# Patient Record
Sex: MALE | Race: BLACK OR AFRICAN AMERICAN | Employment: FULL TIME | URBAN - METROPOLITAN AREA
[De-identification: names, ages, dates, MRNs, and addresses within clinical notes are randomized per-mention and may not be internally consistent; named-entity substitution may affect disease eponyms.]

---

## 2017-01-06 ENCOUNTER — HOSPITAL ENCOUNTER (OUTPATIENT)
Dept: MRI IMAGING | Age: 32
Discharge: HOME | End: 2017-01-06
Attending: FAMILY MEDICINE
Payer: COMMERCIAL

## 2017-01-06 DIAGNOSIS — M25.562 ACUTE PAIN OF LEFT KNEE: ICD-10-CM

## 2017-01-06 PROCEDURE — 73721 MRI JNT OF LWR EXTRE W/O DYE: CPT

## 2017-01-09 DIAGNOSIS — M22.2X2 PATELLOFEMORAL SYNDROME OF LEFT KNEE: Primary | ICD-10-CM

## 2017-02-01 ENCOUNTER — HOSPITAL ENCOUNTER (OUTPATIENT)
Dept: PHYSICAL THERAPY | Age: 32
Discharge: HOME | End: 2017-02-01
Payer: COMMERCIAL

## 2017-02-01 PROCEDURE — 97110 THERAPEUTIC EXERCISES: CPT

## 2017-02-01 PROCEDURE — 97161 PT EVAL LOW COMPLEX 20 MIN: CPT

## 2017-02-01 NOTE — PROGRESS NOTES
[facility]  [address] [address], [address] [place] [address], [address]  Phone: [phone]  Fax: [phone]    Plan of Care/Statement of Necessity for Physical Therapy Services  2-15    Patient name: Stan Arndt  : 1985  Provider#: [id number]  Referral source: Anabel Perez MD      Medical/Treatment Diagnosis: Patellofemoral disorders, left knee [M22.2X2]     Prior Hospitalization: see medical history     Comorbidities: asthma  Prior Level of Function: recreational running and sprinting, works as a  at [place]  Medications: Verified on Patient Summary List    Licensed Physical Therapist was present throughout treatment and actively engaged in all aspects of care. Start of Care: 17      Onset Date: May 2016       The Plan of Care and following information is based on the information from the initial evaluation. Assessment/ key information:   Patient referred for L patellofemoral pain presents today with decreased flexibility and hip strength. Educated patient on L knee MRI results. Goal will be to increase L LE flexibility and strengthen VMO to correct patellar mechanics.         Evaluation Complexity History MEDIUM  Complexity : 1-2 comorbidities / personal factors will impact the outcome/ POC ; Examination HIGH Complexity : 4+ Standardized tests and measures addressing body structure, function, activity limitation and / or participation in recreation  ;Presentation LOW Complexity : Stable, uncomplicated  ;Clinical Decision Making MEDIUM Complexity : FOTO score of 26-74  Overall Complexity Rating: LOW     Problem List: pain affecting function, decrease ROM, decrease strength, decrease activity tolerance and decrease flexibility/ joint mobility   Treatment Plan may include any combination of the following: Therapeutic exercise, Therapeutic activities, Neuromuscular re-education, Physical agent/modality, Gait/balance training, Manual therapy, Patient education, Self Care training, Functional mobility training and Stair training  Patient / Family readiness to learn indicated by: asking questions, trying to perform skills and interest  Persons(s) to be included in education: patient (P)  Barriers to Learning/Limitations: None  Patient Goal (s): To be able to reduce pain so I can try to do sprints again  Patient Self Reported Health Status: excellent  Rehabilitation Potential: good    Short Term Goals: To be accomplished in 2 weeks:   1. Patient will be able to perform HEP without verbal cuing   2. Patient will be able to perform 10 bilateral squats without knee pain  Long Term Goals: To be accomplished in 4 weeks:   1. Pt will be able to complete 10 single limb squats without pain to order to jog without pain   2. Patient will be able to sprint 100 yards without knee pain. 3. Patient will be able to jog for at least 30 minutes without pain  Frequency / Duration: Patient to be seen 2 times per week for 4 weeks. Patient/ Caregiver education and instruction: self care, activity modification and exercises    [x]  Plan of care has been reviewed with BEBE Tobar 2/1/2017 5:08 PM    ________________________________________________________________________    I certify that the above Therapy Services are being furnished while the patient is under my care. I agree with the treatment plan and certify that this therapy is necessary.     [de-identified] Signature:____________________  Date:____________Time: _________

## 2017-02-01 NOTE — PROGRESS NOTES
PT INITIAL EVALUATION NOTE 2-15    Patient Name: Doron De La Cruz  Date:2017  : 1985  [x]  Patient  Verified  Payor: Juanita De La Cruz / Plan: Rufina Ramsey PPO / Product Type: PPO /    In time:5:20  Out time:6:20  Total Treatment Time (min): 60  Visit #: 1    Licensed Physical Therapist was present throughout treatment and actively engaged in all aspects of care. Treatment Area: Patellofemoral disorders, left knee [M22.2X2]    SUBJECTIVE  Pain Level (0-10 scale): 0-1/10  Any medication changes, allergies to medications, adverse drug reactions, diagnosis change, or new procedure performed?: [] No    [x] Yes (see summary sheet for update)  Subjective:     L Patellofemoral pain. L knee feels better than it used to. He'll feels it when he is jumping and planting hard \"wobble. \"  Did the elliptical around [address] [employer] week. Doesn't use brace as much anymore. Last week went running on the track and feels it in the first 5-7 minutes in the front of the knee. 2-3 times/week takes medication. Feels a little crepitus. No swelling noted. Hip flexor stretch and quad stretch might be painful. Wants to be able to fully run (sprint), worried about the creaky sound in his knee. Sitting down and rest helps it. Pain in knee initially started back in May of 2016. Works as a           OBJECTIVE/EXAMINATION    Other Observations: bilateral patella lance and L patella is laterally tilted. Stands with feet externally rotated, knock kneed  Gait and Functional Mobility:  Walks with feet externally rotated, knee valgus  Palpation: TTP on anterior patella tendon    R Knee PROM Flex: 118 deg  Ext: 0 deg  L Knee PROM Flex: 120 deg  Ext: 0 deg    Flexibility: bilateral hamstring and gastroc tightness    Joint mobility: hypermobility in bilateral patellofemoral joints    LOWER QUARTER   MUSCLE STRENGTH  KEY       R  L  0 - No Contraction  Knee ext  5/5  5/5 p!  On front of knee  1 - Trace flex  5/5  5/5  2 - Poor   Hip ext   NT  NT  3 - Fair          flex   4/5  4/5  4 - Good         abd  NT  NT  5 - Normal         add  NT  NT      Ankle DF  5/5  5/5                PF  5/5  5/5  MMT: VMO 4+/5 bilaterally               Special Tests:                  H.S. 90/90: + for tightness bilaterally          Joint line tenderness: (-) bilaterally          Knee Varus/Valgus Stress: (-)  Knee Lachmans: (-)    SLS: able to stand > 10 sec bilaterally    DL squat:hip ER bilaterally, pain felt at end range and on the way up         10 min Therapeutic Exercise:  [x] See flow sheet : to demonstrate HEP   Rationale: increase ROM and increase strength to improve the patients ability to decrease patellofemoral pain          With   [x] TE   [] TA   [] neuro   [] other: Patient Education: [x] Review HEP    [] Progressed/Changed HEP based on:   [] positioning   [] body mechanics   [] transfers   [] heat/ice application    [] other:        Other Objective/Functional Measures: 55/100    Pain Level (0-10 scale) post treatment: 0-1/10      ASSESSMENT:      [x]  See Plan of Care      Kavya Tobar 2/1/2017  5:08 PM

## 2017-02-06 ENCOUNTER — HOSPITAL ENCOUNTER (OUTPATIENT)
Dept: PHYSICAL THERAPY | Age: 32
Discharge: HOME | End: 2017-02-06
Payer: COMMERCIAL

## 2017-02-06 PROCEDURE — 97110 THERAPEUTIC EXERCISES: CPT

## 2017-02-06 NOTE — PROGRESS NOTES
PT DAILY TREATMENT NOTE 2-15    Patient Name: Jesusita Brand  Date:2017  : 1985  [x]  Patient  Verified  Payor: Pb Arevalo / Plan: Carry Abeba PPO / Product Type: PPO /    In time:4:35  Out time:5:20  Total Treatment Time (min): 45  Visit #: 2     Treatment Area: Patellofemoral disorders, left knee [B19.4S2]    Licensed Physical Therapist was present throughout treatment and actively engaged in all aspects of care. SUBJECTIVE  Pain Level (0-10 scale): 0/10  Any medication changes, allergies to medications, adverse drug reactions, diagnosis change, or new procedure performed?: [x] No    [] Yes (see summary sheet for update)  Subjective functional status/changes:   [] No changes reported  \"I actually sprinted on it yesterday and it felt ok. \"    OBJECTIVE    45 min Therapeutic Exercise:  [x] See flow sheet :   Rationale: increase ROM, increase strength and improve balance to improve the patients ability to decrease knee pain    With   [x] TE   [] TA   [] neuro   [] other: Patient Education: [x] Review HEP    [] Progressed/Changed HEP based on:   [] positioning   [] body mechanics   [] transfers   [] heat/ice application    [] other:      Other Objective/Functional Measures: NT     Pain Level (0-10 scale) post treatment: 0/10    ASSESSMENT/Changes in Function:   Progress plan of care to include agility and plyometrics training next visit. Patient will continue to benefit from skilled PT services to modify and progress therapeutic interventions, address functional mobility deficits, address ROM deficits, address strength deficits, analyze and address soft tissue restrictions, analyze and cue movement patterns, analyze and modify body mechanics/ergonomics, assess and modify postural abnormalities and address imbalance/dizziness to attain remaining goals. []  See Plan of Care  []  See progress note/recertification  []  See Discharge Summary         Progress towards goals / Updated goals:  1.  Patient will be able to perform HEP without verbal cuing  2. Patient will be able to perform 10 bilateral squats without knee pain  Long Term Goals: To be accomplished in 4 weeks:  1. Pt will be able to complete 10 single limb squats without pain to order to jog without pain  2. Patient will be able to sprint 100 yards without knee pain. 3. Patient will be able to jog for at least 30 minutes without pain    PLAN  [x]  Upgrade activities as tolerated     [x]  Continue plan of care  [x]  Update interventions per flow sheet       []  Discharge due to:_  []  Other:_      Gretchen Zamorano.  [place] 2/6/2017  4:42 PM

## 2017-02-08 ENCOUNTER — APPOINTMENT (OUTPATIENT)
Dept: PHYSICAL THERAPY | Age: 32
End: 2017-02-08
Payer: COMMERCIAL

## 2017-02-16 ENCOUNTER — APPOINTMENT (OUTPATIENT)
Dept: PHYSICAL THERAPY | Age: 32
End: 2017-02-16
Payer: COMMERCIAL

## 2017-02-16 ENCOUNTER — TELEPHONE (OUTPATIENT)
Dept: INTERNAL MEDICINE CLINIC | Age: 32
End: 2017-02-16

## 2017-02-16 NOTE — TELEPHONE ENCOUNTER
----- Message from Della Jackman sent at 2/16/2017 12:48 PM EST -----  Regarding: /Corine   Pt would like a call back from the nurse. The pt has been wheezing for 3 days and has a crackle in his lungs. Pt stated that it gets worse at night. Pt would like a call back for the nurse as soon as possible today at  [phone].

## 2017-02-17 ENCOUNTER — OFFICE VISIT (OUTPATIENT)
Dept: INTERNAL MEDICINE CLINIC | Age: 32
End: 2017-02-17

## 2017-02-17 VITALS
BODY MASS INDEX: 31.85 KG/M2 | DIASTOLIC BLOOD PRESSURE: 83 MMHG | HEART RATE: 86 BPM | SYSTOLIC BLOOD PRESSURE: 147 MMHG | TEMPERATURE: 98.3 F | WEIGHT: 222 LBS | RESPIRATION RATE: 16 BRPM

## 2017-02-17 DIAGNOSIS — J45.21 MILD INTERMITTENT ASTHMA WITH ACUTE EXACERBATION: Primary | ICD-10-CM

## 2017-02-17 DIAGNOSIS — R06.2 WHEEZING: ICD-10-CM

## 2017-02-17 RX ORDER — AZITHROMYCIN 250 MG/1
TABLET, FILM COATED ORAL
Qty: 6 TAB | Refills: 0 | Status: SHIPPED | OUTPATIENT
Start: 2017-02-17 | End: 2017-02-22

## 2017-02-17 RX ORDER — PREDNISONE 20 MG/1
20 TABLET ORAL 3 TIMES DAILY
Qty: 21 TAB | Refills: 0 | Status: SHIPPED | OUTPATIENT
Start: 2017-02-17 | End: 2017-12-01

## 2017-02-17 RX ORDER — MELOXICAM 15 MG/1
15 TABLET ORAL
Qty: 30 TAB | Refills: 0 | Status: SHIPPED | OUTPATIENT
Start: 2017-02-17 | End: 2017-12-01

## 2017-02-17 NOTE — PATIENT INSTRUCTIONS

## 2017-02-17 NOTE — MR AVS SNAPSHOT
Visit Information Date & Time Provider Department Dept. Phone Encounter #  
 2/17/2017 10:45 AM Hanh Mendoza MD [facility] [place] [id number] Follow-up Instructions Return if symptoms worsen or fail to improve. Follow-up and Disposition History Upcoming Health Maintenance Date Due DTaP/Tdap/Td series (1 - Tdap) 5/29/2008 INFLUENZA AGE 9 TO ADULT 8/1/2016 Allergies as of 2/17/2017  Review Complete On: 2/17/2017 By: Hanh Mendoza MD  
 No Known Allergies Current Immunizations  Never Reviewed No immunizations on file. Not reviewed this visit You Were Diagnosed With   
  
 Codes Comments Mild intermittent asthma with acute exacerbation    -  Primary ICD-10-CM: J45.21 ICD-9-CM: 784.65 Wheezing     ICD-10-CM: R06.2 ICD-9-CM: 786.07 Vitals BP Pulse Temp Resp Weight(growth percentile) BMI  
 147/83 86 98.3 °F (36.8 °C) 16 222 lb (100.7 kg) 31.85 kg/m2 Smoking Status Never Smoker BMI and BSA Data Body Mass Index Body Surface Area  
 31.85 kg/m 2 2.23 m 2 Preferred Pharmacy Pharmacy Name Phone [facility] [employer] [facility]  [place] [phone] Your Updated Medication List  
  
   
This list is accurate as of: 2/17/17 11:53 AM.  Always use your most recent med list.  
  
  
  
  
 ADVAIR DISKUS 100-50 mcg/dose diskus inhaler Generic drug:  fluticasone-salmeterol Take 1 Puff by inhalation every twelve (12) hours. * albuterol 2.5 mg /3 mL (0.083 %) nebulizer solution Commonly known as:  PROVENTIL VENTOLIN  
by Nebulization route once. * VENTOLIN HFA 90 mcg/actuation inhaler Generic drug:  albuterol Take  by inhalation. azithromycin 250 mg tablet Commonly known as:  [place] Ping Take 2 tablets today, then take 1 tablet daily BACTRIM PO Take  by mouth. KEFLEX PO Take  by mouth.  
  
 meloxicam 15 mg tablet Commonly known as:  MOBIC Take 1 Tab by mouth daily as needed for Pain. predniSONE 20 mg tablet Commonly known as:  Marcelene Im Take 1 Tab by mouth three (3) times daily. * Notice: This list has 2 medication(s) that are the same as other medications prescribed for you. Read the directions carefully, and ask your doctor or other care provider to review them with you. Prescriptions Sent to Pharmacy Refills  
 predniSONE (DELTASONE) 20 mg tablet 0 Sig: Take 1 Tab by mouth three (3) times daily. Class: Normal  
 Pharmacy: [facility] [address] Ph #: [phone] Route: Oral  
 azithromycin (ZITHROMAX) 250 mg tablet 0 Sig: Take 2 tablets today, then take 1 tablet daily Class: Normal  
 Pharmacy: [facility] [address] AT [address] [place] [address] Road Ph #: [phone]  
 meloxicam (MOBIC) 15 mg tablet 0 Sig: Take 1 Tab by mouth daily as needed for Pain. Class: Normal  
 Pharmacy: [facility] [address] Ph #: [phone] Route: Oral  
  
We Performed the Following REFERRAL TO ALLERGY [REF5 Custom] Comments:  
 Please evaluate patient for asthma. Follow-up Instructions Return if symptoms worsen or fail to improve. To-Do List   
 02/17/2017 Imaging:  XR CHEST PA LAT   
  
 02/20/2017 1:30 PM  
  Appointment with Burt Spurling, PT, DPT at [facility] [address] ([phone])  
  
 02/28/2017 5:30 PM  
  Appointment with Burt Spurling, PT, DPT at [facility] [address] ([phone]) Referral Information Referral ID Referred By Referred To  
  
 [id number] [facility] [address] [place] [address] [facility] [address]   
   [place] [address] [facility], [address] [facility] Visits Status Start Date End Date 1 New Request 2/17/17 2/17/18 If your referral has a status of pending review or denied, additional information will be sent to support the outcome of this decision. Patient Instructions Asthma Attack: Care Instructions Your Care Instructions During an asthma attack, the airways swell and narrow. This makes it hard to breathe. Severe asthma attacks can be life-threatening, but you can help prevent them by keeping your asthma under control and treating symptoms before they get bad. Symptoms include being short of breath, having chest tightness, coughing, and wheezing. Noting and treating these symptoms can also help you avoid future trips to the emergency room. The doctor has checked you carefully, but problems can develop later. If you notice any problems or new symptoms, get medical treatment right away. Follow-up care is a key part of your treatment and safety. Be sure to make and go to all appointments, and call your doctor if you are having problems. It's also a good idea to know your test results and keep a list of the medicines you take. How can you care for yourself at home? · Follow your asthma action plan to prevent and treat attacks. If you don't have an asthma action plan, work with your doctor to create one. · Take your asthma medicines exactly as prescribed. Talk to your doctor right away if you have any questions about how to take them. ¨ Use your quick-relief medicine when you have symptoms of an attack. Quick-relief medicine is usually an albuterol inhaler. Some people need to use quick-relief medicine before they exercise. ¨ Take your controller medicine every day, not just when you have symptoms. Controller medicine is usually an inhaled corticosteroid. The goal is to prevent problems before they occur. Don't use your controller medicine to treat an attack that has already started.  It doesn't work fast enough to help. ¨ If your doctor prescribed corticosteroid pills to use during an attack, take them exactly as prescribed. It may take hours for the pills to work, but they may make the episode shorter and help you breathe better. ¨ Keep your quick-relief medicine with you at all times. · Talk to your doctor before using other medicines. Some medicines, such as aspirin, can cause asthma attacks in some people. · If you have a peak flow meter, use it to check how well you are breathing. This can help you predict when an asthma attack is going to occur. Then you can take medicine to prevent the asthma attack or make it less severe. · Do not smoke or allow others to smoke around you. Avoid smoky places. Smoking makes asthma worse. If you need help quitting, talk to your doctor about stop-smoking programs and medicines. These can increase your chances of quitting for good. · Learn what triggers an asthma attack for you, and avoid the triggers when you can. Common triggers include colds, smoke, air pollution, dust, pollen, mold, pets, cockroaches, stress, and cold air. · Avoid colds and the flu. Get a pneumococcal vaccine shot. If you have had one before, ask your doctor if you need a second dose. Get a flu vaccine every fall. If you must be around people with colds or the flu, wash your hands often. When should you call for help? Call 911 anytime you think you may need emergency care. For example, call if: 
· You have severe trouble breathing. Call your doctor now or seek immediate medical care if: 
· Your symptoms do not get better after you have followed your asthma action plan. · You have new or worse trouble breathing. · Your coughing and wheezing get worse. · You cough up dark brown or bloody mucus (sputum). · You have a new or higher fever.  
Watch closely for changes in your health, and be sure to contact your doctor if: 
· You need to use quick-relief medicine on more than 2 days a week (unless it is just for exercise). · You cough more deeply or more often, especially if you notice more mucus or a change in the color of your mucus. · You are not getting better as expected. Where can you learn more? Go to http://remy-roz.info/. Enter A930 in the search box to learn more about \"Asthma Attack: Care Instructions. \" Current as of: May 23, 2016 Content Version: 11.1 © 2839-9993 [employer]. Care instructions adapted under license by "[employer] (which disclaims liability or warranty for this information). If you have questions about a medical condition or this instruction, always ask your healthcare professional. [address] any warranty or liability for your use of this information. Introducing [facility]! [facility] introduces [employer] patient portal. Now you can access parts of your medical record, email your doctor's office, and request medication refills online. 1. In your internet browser, go to [email or web address] [email or web address] 2. Click on the First Time User? Click Here link in the Sign In box. You will see the New Member Sign Up page. 3. Enter your [employer] Access Code exactly as it appears below. You will not need to use this code after youve completed the sign-up process. If you do not sign up before the expiration date, you must request a new code. · [employer] Access Code: [email or web address] Expires: 3/30/2017 10:21 AM 
 
4. Enter the last four digits of your Social Security Number (xxxx) and Date of Birth (mm/dd/yyyy) as indicated and click Submit. You will be taken to the next sign-up page. 5. Create a [employer]Matrix ID. This will be your [employer] login ID and cannot be changed, so think of one that is secure and easy to remember. 6. Create a [employer] password. You can change your password at any time. 7. Enter your Password Reset Question and Answer.  This can be used at a later time if you forget your password. 8. Enter your e-mail address. You will receive e-mail notification when new information is available in [address] 9. Click Sign Up. You can now view and download portions of your medical record. 10. Click the Download Summary menu link to download a portable copy of your medical information. If you have questions, please visit the Frequently Asked Questions section of the medineering website. Remember, [employer]ineering is NOT to be used for urgent needs. For medical emergencies, dial 911. Now available from your iPhone and Android! Please provide this summary of care documentation to your next provider. Your primary care clinician is listed as Germania Ramos. If you have any questions after today's visit, please call [phone].

## 2017-02-17 NOTE — PROGRESS NOTES
Chief Complaint   Patient presents with    Wheezing     he is a 36y.o. year old male who presents for evaluation of wheezing  congestion, sore throat and productive cough for 4 days. {Fever:none   no nausea . No congestion, sore throat and productive cough. Over-the-counter remedies including mucinex, Nyquil   has never been tried. Hx Asthma:  yes  Smoker:  no  Contacts with similar infections: yes   Recent travel:no         Reviewed and agree with Nurse Note and duplicated in this note. Reviewed PmHx, RxHx, FmHx, SocHx, AllgHx and updated and dated in the chart. No family history on file. Past Medical History   Diagnosis Date    Asthma     Ill-defined condition      L knee      Social History     Social History    Marital status:      Spouse name: N/A    Number of children: 2    Years of education: N/A     Occupational History   [address] [employer] [employer]     Social History Main Topics    Smoking status: Never Smoker    Smokeless tobacco: Not on file    Alcohol use Yes      Comment: socially    Drug use: No    Sexual activity: Not on file     Other Topics Concern    Not on file     Social History Narrative    Chris quinn, Attends Speaking [employer]        Review of Systems - negative except as listed above      Objective: There were no vitals filed for this visit.     Physical Examination: General appearance - alert, well appearing, and in no distress  Eyes - pupils equal and reactive, extraocular eye movements intact  Ears - bilateral TM's and external ear canals normal  Nose - normal and patent, no erythema, discharge or polyps  Mouth - mucous membranes moist, pharynx normal without lesions  Neck - supple, no significant adenopathy  Chest - wheezing noted all lung fields  Heart - normal rate, regular rhythm, normal S1, S2, no murmurs, rubs, clicks or gallops  Abdomen - soft, nontender, nondistended, no masses or organomegaly  Extremities - peripheral pulses normal, no pedal edema, no clubbing or cyanosis  Skin - normal coloration and turgor, no rashes, no suspicious skin lesions noted    Assessment/ Plan:   Nic Schmitz was seen today for wheezing and knee pain. Diagnoses and all orders for this visit:    Mild intermittent asthma with acute exacerbation    Wheezing  -     XR CHEST PA LAT; Future    Other orders  -     predniSONE (DELTASONE) 20 mg tablet; Take 1 Tab by mouth three (3) times daily. -     azithromycin (ZITHROMAX) 250 mg tablet; Take 2 tablets today, then take 1 tablet daily     Follow-up Disposition: Not on File  Adults: For nasal congestion, cough and cold/flu symptoms I advised:    - Seek medical care if symptoms become more severe or if you develop      chest pain, shortness of breath, confusion.    - Contact us if your symptoms fail to improve after 7-10 days   - Rest as much as possible and stay home from work/school at least 24 hours                  after last fever              - Wash hand frequently and cough/sneeze into your sleeve to help prevent       infection of others   - Drink plenty of fluids   - Ibuprofen (Advil, Motrin) 400-800mg every 6 hours or                  Aleve 220 mg 1-2 pills every 8 hours for fever, headache, pain   - Tylenol extra strength 500 mg every 6 hours for pain, headache, fever   - Nasal saline rinses 2-3 times daily for nasal congestion   - Mucinex 1200 mg twice daily or Guaifenesin 400 mg every 4 hours for chest       congestion              - Robitussin DM or Delsym for cough(suppress cough and thin mucus.  )   - Cepacol throat lozenges and saline gargles (1 tsp salt in 8 oz water) for sore       throat   - Tea with honey for cough (buckwheat honey preferred)              - Benadryl (diphenhydramine) 50 mg at night for nasal congestion/allergies   - Pseudoephedrine 12-hour tablets twice daily for nasal and inner ear       -Ask your pharmacist (this is kept behind the counter)     -If you have high blood pressure or heart disease, use this        medication with caution (ask your doctor), alternative coricidin   - Afrin (oxymetazoline) nasal spray 2 sprays in each nostril twice daily for severe      congestion.       -Do not use this medication for more than 3 days as it may cause         \"rebound congestion\". -If you have high blood pressure or heart disease, use this medication       with caution (ask your doctor)      Children:   Sit in bathroom with hot shower running for steam.    Nasal saline rinses and Neti pot  In general for viral respiratory infection -  Aim for drainage/increased airflow  Increase fluids - Pedialyte popsicles, Gatorade mixed with 50% water       I have discussed the diagnosis with the patient and the intended plan as seen in the above orders. The patient has received an after-visit summary and questions were answered concerning future plans. Medication Side Effects and Warnings were discussed with patient: yes  Patient Labs were reviewed and or requested: yes  Patient Past Records were reviewed and or requested  yes  I have discussed the diagnosis with the patient and the intended plan as seen in the above orders. The patient has received an after-visit summary and questions were answered concerning future plans. Pt agrees to call or return to clinic and/or go to closest ER with any worsening of symptoms. This may include, but not limited to increased fever (>100.4) with NSAIDS or Tylenol, increased edema, confusion, rash, worsening of presenting symptoms. Patient was informed/counseled to:    1) Remember to stay active and/or exercise regularly (I suggest 30-45 minutes daily)   2) For reliable dietary information, go to www. EATRIGHT.org. You may wish to consider seeing the nutritionist at [facility] at #[phone] or [phone], also consider the [address] [facility].   3) I routinely suggest a complete physical exam once each year (your birth month)

## 2017-02-20 ENCOUNTER — HOSPITAL ENCOUNTER (OUTPATIENT)
Dept: PHYSICAL THERAPY | Age: 32
Discharge: HOME | End: 2017-02-20
Payer: COMMERCIAL

## 2017-02-20 PROCEDURE — 97110 THERAPEUTIC EXERCISES: CPT

## 2017-02-20 NOTE — PROGRESS NOTES
PT DAILY TREATMENT NOTE 2-15    Patient Name: Darryle Hides  Date:2017  : 1985  [x]  Patient  Verified  Payor: Cher Cordova / Plan: Bobby Angel PPO / Product Type: PPO /    In time:1:40  Out time:2:40  Total Treatment Time (min): 60  Visit #: 3     Licensed Physical Therapist was present throughout treatment and actively engaged in all aspects of care. Treatment Area: Patellofemoral disorders, left knee [M22.2X2]    SUBJECTIVE  Pain Level (0-10 scale): 3/10  Any medication changes, allergies to medications, adverse drug reactions, diagnosis change, or new procedure performed?: [x] No    [] Yes (see summary sheet for update)  Subjective functional status/changes:   [] No changes reported  \"Sorry I missed my last appointments because I had a respiratory issue. My knee stiffened up so today it hurts a little more. \"    OBJECTIVE    60 min Therapeutic Exercise:  [x] See flow sheet :   Rationale: increase ROM, increase strength and improve balance to improve the patients ability to decrease L knee pain          With   [x] TE   [] TA   [] neuro   [] other: Patient Education: [x] Review HEP    [] Progressed/Changed HEP based on:   [] positioning   [] body mechanics   [] transfers   [] heat/ice application    [] other:      Other Objective/Functional Measures: NT     Pain Level (0-10 scale) post treatment: 0/10    ASSESSMENT/Changes in Function:   Did less today due to recovery from respiratory issues. Progress POC to include ladder and plyometric exercises next visit per patient tolerance. Patient will continue to benefit from skilled PT services to modify and progress therapeutic interventions, address functional mobility deficits, address ROM deficits, address strength deficits, analyze and address soft tissue restrictions, analyze and cue movement patterns and analyze and modify body mechanics/ergonomics to attain remaining goals.      []  See Plan of Care  []  See progress note/recertification  [] See Discharge Summary         Progress towards goals / Updated goals:  1. Patient will be able to perform HEP without verbal cuing  2. Patient will be able to perform 10 bilateral squats without knee pain  Long Term Goals: To be accomplished in 4 weeks:  1. Pt will be able to complete 10 single limb squats without pain to order to jog without pain  2. Patient will be able to sprint 100 yards without knee pain. 3. Patient will be able to jog for at least 30 minutes without pain    PLAN  [x]  Upgrade activities as tolerated     [x]  Continue plan of care  [x]  Update interventions per flow sheet       []  Discharge due to:_  []  Other:_      Karla Cai.  [place] 2/20/2017  2:11 PM

## 2017-05-22 ENCOUNTER — OFFICE VISIT (OUTPATIENT)
Dept: INTERNAL MEDICINE CLINIC | Age: 32
End: 2017-05-22

## 2017-05-22 VITALS
SYSTOLIC BLOOD PRESSURE: 111 MMHG | WEIGHT: 218 LBS | DIASTOLIC BLOOD PRESSURE: 69 MMHG | HEART RATE: 60 BPM | RESPIRATION RATE: 14 BRPM | BODY MASS INDEX: 31.21 KG/M2 | HEIGHT: 70 IN

## 2017-05-22 DIAGNOSIS — M22.2X2 PATELLOFEMORAL PAIN SYNDROME OF LEFT KNEE: ICD-10-CM

## 2017-05-22 DIAGNOSIS — M25.531 RIGHT WRIST PAIN: Primary | ICD-10-CM

## 2017-05-22 DIAGNOSIS — M22.2X1 PATELLOFEMORAL SYNDROME, RIGHT: ICD-10-CM

## 2017-05-22 DIAGNOSIS — M79.641 RIGHT HAND PAIN: ICD-10-CM

## 2017-05-22 DIAGNOSIS — M25.561 ACUTE PAIN OF RIGHT KNEE: ICD-10-CM

## 2017-05-22 NOTE — MR AVS SNAPSHOT
Visit Information Date & Time Provider Department Dept. Phone Encounter #  
 5/22/2017  4:30 PM Mackenzie Arana MD [facility] [phone] [id number] Follow-up Instructions Return if symptoms worsen or fail to improve. Follow-up and Disposition History Upcoming Health Maintenance Date Due Pneumococcal 19-64 Medium Risk (1 of 1 - PPSV23) 5/29/2006 DTaP/Tdap/Td series (1 - Tdap) 5/29/2008 Influenza Age 5 to Adult 8/1/2017 Allergies as of 5/22/2017  Review Complete On: 2/17/2017 By: Mackenzie Arana MD  
 No Known Allergies Current Immunizations  Never Reviewed No immunizations on file. Not reviewed this visit You Were Diagnosed With   
  
 Codes Comments Right wrist pain    -  Primary ICD-10-CM: M25.531 ICD-9-CM: 719.43 Right hand pain     ICD-10-CM: M79.641 ICD-9-CM: 729.5 Acute pain of right knee     ICD-10-CM: M25.561 ICD-9-CM: 719.46 Patellofemoral pain syndrome of left knee     ICD-10-CM: M22.2X2 ICD-9-CM: 719.46 Patellofemoral syndrome, right     ICD-10-CM: M22.2X1 ICD-9-CM: 719.46 Vitals BP Pulse Resp Height(growth percentile) Weight(growth percentile) BMI  
 111/69 60 14 5' 10\" (1.778 m) 218 lb (98.9 kg) 31.28 kg/m2 Smoking Status Never Smoker BMI and BSA Data Body Mass Index Body Surface Area  
 31.28 kg/m 2 2.21 m 2 Preferred Pharmacy Pharmacy Name Phone [facility] [address] [place] [address] [place] [phone] Your Updated Medication List  
  
   
This list is accurate as of: 5/22/17 11:59 PM.  Always use your most recent med list.  
  
  
  
  
 ADVAIR DISKUS 100-50 mcg/dose diskus inhaler Generic drug:  fluticasone-salmeterol Take 1 Puff by inhalation every twelve (12) hours. VENTOLIN HFA 90 mcg/actuation inhaler Generic drug:  albuterol Take  by inhalation. We Performed the Following REFERRAL TO PHYSICAL THERAPY [OQH21 Custom] Follow-up Instructions Return if symptoms worsen or fail to improve. To-Do List   
 05/22/2017 Imaging:  XR HAND RT MIN 3 V   
  
 05/22/2017 Imaging:  XR WRIST RT AP/LAT/OBL MIN 3V   
  
 06/29/2017 Imaging:  XR HAND RT MIN 3 V   
  
 06/29/2017 Imaging:  XR WRIST RT AP/LAT/OBL MIN 3V Referral Information Referral ID Referred By Referred To  
  
 [id number] OP PT DANNIELLE   
   [address] [facility], [address] Phone: [phone] Fax: [phone] Visits Status Start Date End Date  
 20 Closed 5/22/17 5/22/18 If your referral has a status of pending review or denied, additional information will be sent to support the outcome of this decision. Introducing [facility] SERVICES! Carlos Marley introduces Sionex patient portal. Now you can access parts of your medical record, email your doctor's office, and request medication refills online. 1. In your internet browser, go to [email or web address] 2. Click on the First Time User? Click Here link in the Sign In box. You will see the New Member Sign Up page. 3. Enter your Sionex Access Code exactly as it appears below. You will not need to use this code after youve completed the sign-up process. If you do not sign up before the expiration date, you must request a new code. · Sionex Access Code: [email or web address] Expires: 2/24/2018 12:22 PM 
 
4. Enter the last four digits of your Social Security Number (xxxx) and Date of Birth (mm/dd/yyyy) as indicated and click Submit. You will be taken to the next sign-up page. 5. Create a Eurofficet ID. This will be your Sionex login ID and cannot be changed, so think of one that is secure and easy to remember. 6. Create a Eurofficet password. You can change your password at any time. 7. Enter your Password Reset Question and Answer. This can be used at a later time if you forget your password. 8. Enter your e-mail address. You will receive e-mail notification when new information is available in [address] 9. Click Sign Up. You can now view and download portions of your medical record. 10. Click the Download Summary menu link to download a portable copy of your medical information. If you have questions, please visit the Frequently Asked Questions section of the Synaps[employer] website. Remember, Synapsify is NOT to be used for urgent needs. For medical emergencies, dial 911. Now available from your iPhone and Android! Please provide this summary of care documentation to your next provider. Your primary care clinician is listed as Niya Smith. If you have any questions after today's visit, please call [phone].

## 2017-05-22 NOTE — PROGRESS NOTES
Chief Complaint   Patient presents with    Wrist Pain     right    Knee Pain     right,      he is a 36y.o. year old male who presents for evaluation of right wrist and knee pain  Pain Assessment Encounter      Melly Riley  5/22/2017  Onset of Symptoms: 2 weeks  ________________________________________________________________________  Description:pain with pressure    Frequency: more than 5 times a day  Pain Scale:(1-10): 6  Trauma Hx: hit a wall  Hx of similar symptoms: No:   Radiation: no  Duration:  intermittent      Progression: is unchanged  What makes it better?: ice, OTC meds and rest  What makes it worse?:exercise  Medications tried: ibuprofen    Reviewed and agree with Nurse Note and duplicated in this note. Reviewed PmHx, RxHx, FmHx, SocHx, AllgHx and updated and dated in the chart. No family history on file.     Past Medical History:   Diagnosis Date    Asthma     Ill-defined condition     L knee      Social History     Social History    Marital status:      Spouse name: N/A    Number of children: 2    Years of education: N/A     Occupational History   [address]     Social History Main Topics    Smoking status: Never Smoker    Smokeless tobacco: Not on file    Alcohol use Yes      Comment: socially    Drug use: No    Sexual activity: Not on file     Other Topics Concern    Not on file     Social History Narrative    Scientologist kai, Attends Speaking [employer]        Review of Systems - negative except as listed above      Objective:     Vitals:    05/22/17 1651   BP: 111/69   Pulse: 60   Resp: 14   Weight: 218 lb (98.9 kg)   Height: 5' 10\" (1.778 m)       Physical Examination: General appearance - alert, well appearing, and in no distress  Back exam - full range of motion, no tenderness, palpable spasm or pain on motion  Neurological - alert, oriented, normal speech, no focal findings or movement disorder noted  Musculoskeletal - right knee exam:  The patient'sright Knee  is  normal to inspection. The ROM is normal and there is flexion to 60 Effusion is: absent The joint exhibits  absent warmth and Crepitus is: moderate. The Cliff test is:  Negative Joint Line Tenderness is  Positive medially. The [facility] Test is not done   and the Lachman is  negative   . The Anterior Drawer is  Negative. The Posterior Drawer is:  negative. Valgus Stress (for MCL) is:  normal . Varus Stress (for LCL) is  normal  . The Mo Test is negative and the Apprehension Sign:   negative    Right wrist - pain with palpation of metacarpals 2-4, no deformity;  Pain with palpation of scaphoid with palpable swelling    Extremities - peripheral pulses normal, no pedal edema, no clubbing or cyanosis  Skin - normal coloration and turgor, no rashes, no suspicious skin lesions noted    Assessment/ Plan:   Meryle Christmas was seen today for wrist pain and knee pain. Diagnoses and all orders for this visit:    Right wrist pain  -     XR WRIST RT AP/LAT/OBL MIN 3V; Future  -     XR HAND RT MIN 3 V; Future    Right hand pain  -     XR WRIST RT AP/LAT/OBL MIN 3V; Future  -     XR HAND RT MIN 3 V; Future    Acute pain of right knee    Patellofemoral pain syndrome of left knee  -     Cancel: REFERRAL TO ORTHOPEDIC SURGERY  -     REFERRAL TO PHYSICAL THERAPY    Patellofemoral syndrome, right  -     REFERRAL TO PHYSICAL THERAPY         Pathophysiology, recovery and rehabilitation process discussed and questions answered   Counseling for 30 Minutes of the total visit duration   Pictures and figures used as necessary   Provided reassurance   Handouts provided and reviewed with patient for pfps  Recommend use of carpal tunnel brace  Recommend  lower impact activities-walking, Eliptical, Nordic Track, cycling or swimming            I have discussed the diagnosis with the patient and the intended plan as seen in the above orders.   The patient has received an after-visit summary and questions were answered concerning future plans. Medication Side Effects and Warnings were discussed with patient: yes  Patient Labs were reviewed and or requested: yes  Patient Past Records were reviewed and or requested  yes  I have discussed the diagnosis with the patient and the intended plan as seen in the above orders. The patient has received an after-visit summary and questions were answered concerning future plans. Pt agrees to call or return to clinic and/or go to closest ER with any worsening of symptoms. This may include, but not limited to increased fever (>100.4) with NSAIDS or Tylenol, increased edema, confusion, rash, worsening of presenting symptoms. 1) Remember to stay active and/or exercise regularly (I suggest 30-45 minutes daily)   2) For reliable dietary information, go to www. EATRIGHT.org. You may wish to consider seeing the nutritionist at [facility] at #[phone] or [phone], also consider the [address]   3) I routinely suggest a complete physical exam once each year (your birth month)

## 2017-06-20 NOTE — ANCILLARY DISCHARGE INSTRUCTIONS
[facility] Physical Therapy  [address] [address], [address] [place] [address]  Phone: [phone]  Fax: [phone]    Discharge Summary  2-15    Patient name: Lucio Duane  : 1985  Provider#: [id number]  Referral source: Derick Veliz MD      Medical/Treatment Diagnosis: Patellofemoral disorders, left knee [M22.2X2]     Prior Hospitalization: see medical history     Comorbidities: asthma  Prior Level of Function:recreational running and sprinting, works as a  at [place]  Medications: Verified on Patient Summary List    Start of Care: 17      Onset Date:May 2016   Visits from Start of Care: 3     Missed Visits: 2  Reporting Period : 17 to 17     Short Term Goals:  1. Patient will be able to perform HEP without verbal cuing NOT MET  2. Patient will be able to perform 10 bilateral squats without knee pain  NOT MET  Long Term Goals: To be accomplished in 4 weeks:  1. Pt will be able to complete 10 single limb squats without pain to order to jog without pain  NOT MET  2. Patient will be able to sprint 100 yards without knee pain. NOT MET  3. Patient will be able to jog for at least 30 minutes without pain   NOT MET      ASSESSMENT/SUMMARY OF CARE: pt failed to return for remaining PT visits. Pt instructed in HEP. D/c from therapy at this time.     RECOMMENDATIONS:  [x]Discontinue therapy: []Patient has reached or is progressing toward set goals      []Patient is non-compliant or has abdicated      []Due to lack of appreciable progress towards set goals    Luana Goldstein, PT, DPT 2017 12:00 PM

## 2017-06-29 RX ORDER — FLUTICASONE PROPIONATE AND SALMETEROL 100; 50 UG/1; UG/1
1 POWDER RESPIRATORY (INHALATION) EVERY 12 HOURS
Qty: 1 INHALER | Refills: 2 | Status: SHIPPED | OUTPATIENT
Start: 2017-06-29 | End: 2017-12-16 | Stop reason: SDUPTHER

## 2017-06-29 RX ORDER — ALBUTEROL SULFATE 90 UG/1
2 AEROSOL, METERED RESPIRATORY (INHALATION)
Qty: 1 INHALER | Refills: 2 | Status: SHIPPED | OUTPATIENT
Start: 2017-06-29 | End: 2019-05-13 | Stop reason: SDUPTHER

## 2017-07-26 ENCOUNTER — HOSPITAL ENCOUNTER (OUTPATIENT)
Dept: PHYSICAL THERAPY | Age: 32
Discharge: HOME | End: 2017-07-26
Payer: COMMERCIAL

## 2017-07-26 PROCEDURE — 97016 VASOPNEUMATIC DEVICE THERAPY: CPT

## 2017-07-26 PROCEDURE — 97161 PT EVAL LOW COMPLEX 20 MIN: CPT

## 2017-07-26 PROCEDURE — 97110 THERAPEUTIC EXERCISES: CPT

## 2017-07-26 PROCEDURE — 97112 NEUROMUSCULAR REEDUCATION: CPT

## 2017-07-26 NOTE — PROGRESS NOTES
[address] [address] (MOB IV), [address] [facility] Crescencio Pryor  Phone: [phone] Fax: [phone]     Plan of Care/Statement of Necessity for Physical Therapy Services  2-15    Patient name: Esau Pierce  : 1985  Provider#: [id number]  Referral source: Tab Prajapati MD      Medical/Treatment Diagnosis: Left knee pain [M25.562]     Prior Hospitalization: see medical history     Comorbidities: none noted  Prior Level of Function: Pt works full-time as RN for the [employer]; completes 20 minutes of exercise 1-2x/week  Medications: Verified on Patient Summary List  Start of Care: 2017     Onset Date: 2017   The Plan of Care and following information is based on the information from the initial evaluation. Assessment/ key information: Pt is a 29year old male who is referred to Physical Therapy by Dr. Pop Conteh s/p left tibial tubercle osteotomy and arthroscopic microfracture procedure to [facility], 2017. Pt has hinge brace locked in extension and using unilateral axillary crutch on right for ambulation, WBAT. Pt demonstrates decreased right knee ROM: AROM -/5/60 degreePs; PROM -/10/68 degrees  Patient will benefit from skilled PT services to modify and progress therapeutic interventions, address functional mobility deficits, address ROM deficits, address strength deficits, analyze and address soft tissue restrictions and analyze and cue movement patterns to attain pt/PT goals.     Evaluation Complexity History LOW Complexity : Zero comorbidities / personal factors that will impact the outcome / POC; Examination HIGH Complexity : 4+ Standardized tests and measures addressing body structure, function, activity limitation and / or participation in recreation  ;Presentation LOW Complexity : Stable, uncomplicated  ;Clinical Decision Making MEDIUM Complexity : FOTO score of 26-74  Overall Complexity Rating: LOW     Problem List: pain affecting function, decrease ROM, decrease strength, edema affecting function, impaired gait/ balance, decrease ADL/ functional abilitiies, decrease activity tolerance, decrease flexibility/ joint mobility and decrease transfer abilities   Treatment Plan may include any combination of the following: Therapeutic exercise, Therapeutic activities, Neuromuscular re-education, Physical agent/modality, Gait/balance training, Manual therapy, Patient education, Self Care training, Functional mobility training and Stair training  Patient / Family readiness to learn indicated by: asking questions, trying to perform skills and interest  Persons(s) to be included in education: patient (P)  Barriers to Learning/Limitations: None  Patient Goal (s): To return to normal.  Patient Self Reported Health Status: excellent  Rehabilitation Potential: good    Short Term Goals: To be accomplished in 6 weeks:  1) Pt will be Independent with HEP. 2) Pt will demonstrate knee extension >/= to 0 degrees ext to aid in ambulation. 3) Pt will be able to smbulate with more normalized gait pattern on level terrain. 4) Pt will demonstrate knee flexion >/= to 110 to aid in sitting. Long Term Goals: To be accomplished in 12 weeks:  1) Pt will be able to navigate a flight of stairs without pain. 2) Pt will be able to ambulate on uneven terrain without pain or instability. 3) Pt will demonstrate knee flexion >/= 120 degrees to aid in sitting/squatting. 4) Pt will report improvement in overall functional mobility, as measured by FOTO, with an increased score of at least 30 points, from 26 to 56. Frequency / Duration: Patient to be seen 2-3 times per week for 12 weeks.     Patient/ Caregiver education and instruction: self care, activity modification, brace/ splint application and exercises    [x]  Plan of care has been reviewed with BEBE Alcantar, PT, DPT    7/26/2017 3:59 PM    ________________________________________________________________________    I certify that the above Therapy Services are being furnished while the patient is under my care. I agree with the treatment plan and certify that this therapy is necessary.     [de-identified] Signature:____________________  Date:____________Time: _________

## 2017-07-26 NOTE — PROGRESS NOTES
PT INITIAL EVALUATION NOTE - [facility] 2-15    Patient Name: Paola Reyna  Date:2017  : 1985  [x]  Patient  Verified  Payor: Keri Bowels / Plan: Lara Licona PPO / Product Type: PPO /    In time:12:45pm  Out time: 2:15pm  Total Treatment Time (min):  90  Total Timed Codes (min): 65  1:1 Treatment Time ( only): --   Visit #: 1     Treatment Area: Left knee pain [M25.562]    SUBJECTIVE  Pain Level (0-10 scale): 7/10  Any medication changes, allergies to medications, adverse drug reactions, diagnosis change, or new procedure performed?: [] No    [x] Yes (see summary sheet for update)  Subjective:    Pt has had patellofemoral pain for several years. Running last year around Mother's Day, ran 4 miles and the next day could not walk up the stairs due to left knee pain. Pt went to mick Hernandez MD, who said he had OA. Pt wanted a second opinion so went to Dr. Azam Treviño who referred him to PT. Pt participated in outpatient PT at [facility] from 2016-2017 and took Meloxicam- all helped but pain persisted. Pt had MRI in 2017, which showed cartilage damage. Pt tried PT again in the spring 2017, but did not feel any improvement. Dr. Azam Treviño referred pt to Dr. Anil Burleson who performed left knee Kymberly osteotomy and arthroscopic microfracture procedure to trochlea, on 2017. Pt non-weight bearing with bilateral axillary crutches until Monday, 2017 and pt has been using unilateral axillary crutch for ambulation with brace locked in extension, since. PLOF: only able to run 20-25 minutes before aching pain in left knee  Mechanism of Injury: running in Spring 2016  Previous Treatment/Compliance: PT, pain medication, rest, ice  PMHx/Surgical Hx: none noted  Work Hx: 7 [employer]  Living Situation:   Pt Goals:  \"Return to normal.\"  Barriers: pain  Motivation: high  Substance use: none  FABQ Score: moderate OBJECTIVE/EXAMINATION  Posture:  Weight shifted to the right in standing; guarded left LE  Gait and Functional Mobility:  Hinge brace locked in extension donned on left knee; pt using unilateral axillary crutch for ambulation.   Other Observations: left knee wrapped in ace bandage; once removed, observe that surgical incisions appear to be healing nicely- covered with clean and dry steri strips  Palpation: swelling in right knee, 2cm greater than left- measured at popliteal fossa    Left Knee AROM -/5/60 degrees PROM -/10/68 degrees    Joint Mobility Assessment: NT due to swelling and s/p 8 days arthroscopic knee surgery    Flexibility: tightness in left gastrocnemius and soleus muscles    LOWER QUARTER   MUSCLE STRENGTH  KEY       R  L  0 - No Contraction  Knee ext  --  3/5  1 - Trace            flex  --  2/5  2 - Poor   Hip ext   --  4/5  3 - Fair          flex   --  4/5  4 - Good         abd  --  3/5  5 - Normal         add  --  4+/5      Ankle DF  --  5/5                PF  --  5/5          Neurological: Reflexes / Sensations: normal  Special Tests: NT due to 8 days s/p/left zane osteomy    Modality rationale: decrease inflammation and decrease pain to improve the patients ability to ambulate and perform functional activities    Min Type Additional Details    [] Estim: []Att   []Unatt        []TENS instruct                  []IFC  []Premod   []NMES                     []Other:  []w/US   []w/ice   []w/heat  Position:  Location:    []  Traction: [] Cervical       []Lumbar                       [] Prone          []Supineikem                       []Intermittent   []Continuous Lbs:  [] before manual  [] after manual  []w/heat    []  Ultrasound: []Continuous   [] Pulsed at:                           []1MHz   []3MHz Location:  W/cm2:    [] Paraffin         Location:   []w/heat    []  Ice     []  Heat  []  Ice massage Position:  Location:    []  Laser  []  Other: Position:  Location:   15   [x]  Vasopneumatic Device Pressure:       [] lo [x] med [] hi   Temperature: 34  Location: left knee at end of session with LE supported on wedge    [x] Skin assessment post-treatment:  [x]intact []redness- no adverse reaction    []redness  adverse reaction:     10 min Therapeutic Exercise:  [x] See flow sheet :   Rationale: increase ROM, increase strength and improve coordination to improve the patients ability to ambulate and perform functional transfers with increased ease     10 min Neuromuscular Re-education:  [x]  See flow sheet : [place] to left quad with quadriceps set   Rationale: increase strength and quad control to improve the patients ability to perform functional activities    With   [] TE   [] TA   [] neuro   [] other: Patient Education: [x] Review HEP- pt given handout with exercises for HEP    [] Progressed/Changed HEP based on:   [] positioning   [] body mechanics   [] transfers   [] heat/ice application    [] other:      Other Objective/Functional Measures: FOTO: 26    Pain Level (0-10 scale) post treatment: 3/10    ASSESSMENT/Changes in Function:   Pt is a [address]25 [place] [address] old male who is referred to Physical Therapy by Dr. Radha Kathleen s/p left tibial tubercle osteotomy and arthroscopic microfracture procedure to [facility], 07/18/2017. Pt has hinge brace locked in extension and using unilateral axillary crutch on right for ambulation, WBAT. Pt demonstrates decreased right knee ROM: AROM -/5/60 degreePs; PROM -/10/68 degrees  Patient will benefit from skilled PT services to modify and progress therapeutic interventions, address functional mobility deficits, address ROM deficits, address strength deficits, analyze and address soft tissue restrictions and analyze and cue movement patterns to attain pt/PT goals.     [x]  See Plan of [address], PT 7/26/2017  12:50 PM

## 2017-07-27 ENCOUNTER — HOSPITAL ENCOUNTER (OUTPATIENT)
Dept: PHYSICAL THERAPY | Age: 32
Discharge: HOME | End: 2017-07-27
Payer: COMMERCIAL

## 2017-07-27 PROCEDURE — 97110 THERAPEUTIC EXERCISES: CPT

## 2017-07-27 PROCEDURE — 97112 NEUROMUSCULAR REEDUCATION: CPT

## 2017-07-27 PROCEDURE — 97016 VASOPNEUMATIC DEVICE THERAPY: CPT

## 2017-07-27 NOTE — PROGRESS NOTES
PT DAILY TREATMENT NOTE - [facility] 2-15    Patient Name: Kianna Plaza  Date:2017  : 1985  [x]  Patient  Verified  Payor: Waldo Srivastava / Plan: Paulie Irving PPO / Product Type: PPO /    In time: 8:00am  Out time:9:20am  Total Treatment Time (min): 80  Total Timed Codes (min): 65  1:1 Treatment Time ( only): --   Visit #: 2     Treatment Area: Left knee pain [M25.562]    SUBJECTIVE  Pain Level (0-10 scale): 210  Any medication changes, allergies to medications, adverse drug reactions, diagnosis change, or new procedure performed?: [x] No    [] Yes (see summary sheet for update)  Subjective functional status/changes:   [] No changes reported  Pt reports compliance with HEP. Pt enters clinic ambulating with brace donned, locked in extension, without assistive device; pt states that he forgot his crutch this morning because he was rushing.     OBJECTIVE  Modality rationale: decrease inflammation and decrease pain to improve the patients ability to ambulate and perform functional activities with increased ease   Min Type Additional Details    [] Estim: []Att   []Unatt        []TENS instruct                  []IFC  []Premod   []NMES                     []Other:  []w/US   []w/ice   []w/heat  Position:  Location:    []  Traction: [] Cervical       []Lumbar                       [] Prone          []Supine                       []Intermittent   []Continuous Lbs:  [] before manual  [] after manual  []w/heat    []  Ultrasound: []Continuous   [] Pulsed at:                           []1MHz   []3MHz Location:  W/cm2:    [] Paraffin         Location:   []w/heat    []  Ice     []  Heat  []  Ice massage Position:  Location:    []  Laser  []  Other: Position:  Location:   15   [x]  Vasopneumatic Device Pressure:       [] lo [x] med [] hi   Temperature: 34  Location: left knee, with LEs supported on wedge, at end of session   [x] Skin assessment post-treatment:  [x]intact []redness- no adverse reaction    []redness  adverse reaction:     50 min Therapeutic Exercise:  [x] See flow sheet:   Rationale: increase ROM, increase strength, improve coordination and improve balance to improve the patients ability to ambulate and perform functional activities     15 min Neuromuscular Re-education:  [x]  See flow sheet: [place] electrical stimulation to right quad with active quad set   Rationale: increase ROM, increase strength and improve coordination  to improve quad activation and control          With   [] TE   [] TA   [] neuro   [] other: Patient Education: [x] Review HEP    [] Progressed/Changed HEP based on:   [] positioning   [] body mechanics   [] transfers   [] heat/ice application    [] other:      Other Objective/Functional Measures: --     Pain Level (0-10 scale) post treatment: 1/10    ASSESSMENT/Changes in Function:   Added SLR in brace; initially pt required active assistance for flexion but demonstrated improved quad activation and independent performance after first set of 10. Patient will continue to benefit from skilled PT services to modify and progress therapeutic interventions, address functional mobility deficits, address ROM deficits, address strength deficits, analyze and address soft tissue restrictions, analyze and cue movement patterns, analyze and modify body mechanics/ergonomics, assess and modify postural abnormalities and address imbalance to attain remaining goals. []  See Plan of Care  []  See progress note/recertification  []  See Discharge Summary         Progress towards goals / Updated goals:     Short Term Goals: To be accomplished in 6 weeks:  1) Pt will be Independent with HEP. MET  2) Pt will demonstrate knee extension >/= to 0 degrees ext to aid in ambulation. 3) Pt will be able to smbulate with more normalized gait pattern on level terrain. 4) Pt will demonstrate knee flexion >/= to 110 to aid in sitting.     Long Term Goals:  To be accomplished in 12 weeks:  1) Pt will be able to navigate a flight of stairs without pain. 2) Pt will be able to ambulate on uneven terrain without pain or instability. 3) Pt will demonstrate knee flexion >/= 120 degrees to aid in sitting/squatting. 4) Pt will report improvement in overall functional mobility, as measured by FOTO, with an increased score of at least 30 points, from 26 to 56.      PLAN  [x]  Upgrade activities as tolerated     [x]  Continue plan of care  []  Update interventions per flow sheet       []  Discharge due to:_  []  Other:_      Shaina Marie PT, DPT   7/27/2017  8:15 AM

## 2017-07-31 ENCOUNTER — HOSPITAL ENCOUNTER (OUTPATIENT)
Dept: PHYSICAL THERAPY | Age: 32
Discharge: HOME | End: 2017-07-31
Payer: COMMERCIAL

## 2017-07-31 PROCEDURE — 97016 VASOPNEUMATIC DEVICE THERAPY: CPT

## 2017-07-31 PROCEDURE — 97112 NEUROMUSCULAR REEDUCATION: CPT

## 2017-07-31 PROCEDURE — 97110 THERAPEUTIC EXERCISES: CPT

## 2017-07-31 NOTE — PROGRESS NOTES
PT DAILY TREATMENT NOTE - [facility] 2-15    Patient Name: Jerod Mendiola  Date:2017  : 1985  [x]  Patient  Verified  Payor: Debria Gaucher / Plan: Nilda Ibanez PPO / Product Type: PPO /    In time: 8:05am  Out time:9:30am  Total Treatment Time (min): 85  Total Timed Codes (min): 70  1:1 Treatment Time ( only): --   Visit #: 3    Treatment Area: Left knee pain [M25.562]    SUBJECTIVE  Pain Level (0-10 scale): 3/10  Any medication changes, allergies to medications, adverse drug reactions, diagnosis change, or new procedure performed?: [x] No    [] Yes (see summary sheet for update)  Subjective functional status/changes:   [] No changes reported  Pt reports compliance with HEP, 2x/day. Pt complains of new pain in right buttock that radiates down right leg.     OBJECTIVE  Modality rationale: decrease inflammation and decrease pain to improve the patients ability to ambulate and perform functional activities with increased ease   Min Type Additional Details    [] Estim: []Att   []Unatt        []TENS instruct                  []IFC  []Premod   []NMES                     []Other:  []w/US   []w/ice   []w/heat  Position:  Location:    []  Traction: [] Cervical       []Lumbar                       [] Prone          []Supine                       []Intermittent   []Continuous Lbs:  [] before manual  [] after manual  []w/heat    []  Ultrasound: []Continuous   [] Pulsed at:                           []1MHz   []3MHz Location:  W/cm2:    [] Paraffin         Location:   []w/heat    []  Ice     []  Heat  []  Ice massage Position:  Location:    []  Laser  []  Other: Position:  Location:   15   [x]  Vasopneumatic Device Pressure:       [] lo [x] med [] hi   Temperature: 34  Location: left knee, with LEs supported on wedge, at end of session   [x] Skin assessment post-treatment:  [x]intact []redness- no adverse reaction    []redness  adverse reaction:     55 min Therapeutic Exercise:  [x] See flow sheet:   Rationale: increase ROM, increase strength, improve coordination and improve balance to improve the patients ability to ambulate and perform functional activities     15 min Neuromuscular Re-education:  [x]  See flow sheet: [place] electrical stimulation to right quad with active quad set   Rationale: increase ROM, increase strength and improve coordination  to improve quad activation and control          With   [] TE   [] TA   [] neuro   [] other: Patient Education: [x] Review HEP    [] Progressed/Changed HEP based on:   [] positioning   [] body mechanics   [] transfers   [] heat/ice application    [] other:      Other Objective/Functional Measures: --     Pain Level (0-10 scale) post treatment: 2/10    ASSESSMENT/Changes in Function:   Pt demonstrated improved quad control and strength when performing SLR exercises with brace donned. Patient will continue to benefit from skilled PT services to modify and progress therapeutic interventions, address functional mobility deficits, address ROM deficits, address strength deficits, analyze and address soft tissue restrictions, analyze and cue movement patterns, analyze and modify body mechanics/ergonomics, assess and modify postural abnormalities and address imbalance to attain remaining goals. []  See Plan of Care  []  See progress note/recertification  []  See Discharge Summary         Progress towards goals / Updated goals:     Short Term Goals: To be accomplished in 6 weeks:  1) Pt will be Independent with HEP. MET  2) Pt will demonstrate knee extension >/= to 0 degrees ext to aid in ambulation. 3) Pt will be able to smbulate with more normalized gait pattern on level terrain. 4) Pt will demonstrate knee flexion >/= to 110 to aid in sitting.     Long Term Goals: To be accomplished in 12 weeks:  1) Pt will be able to navigate a flight of stairs without pain. 2) Pt will be able to ambulate on uneven terrain without pain or instability.   3) Pt will demonstrate knee flexion >/= 120 degrees to aid in sitting/squatting. 4) Pt will report improvement in overall functional mobility, as measured by FOTO, with an increased score of at least 30 points, from 26 to 56.      PLAN  [x]  Upgrade activities as tolerated     [x]  Continue plan of care  []  Update interventions per flow sheet       []  Discharge due to:_  []  Other:_      Yesenia Toro, PT, DPT   7/31/2017  8:15 AM

## 2017-08-03 ENCOUNTER — HOSPITAL ENCOUNTER (OUTPATIENT)
Dept: PHYSICAL THERAPY | Age: 32
Discharge: HOME | End: 2017-08-03
Payer: COMMERCIAL

## 2017-08-03 PROCEDURE — 97016 VASOPNEUMATIC DEVICE THERAPY: CPT

## 2017-08-03 PROCEDURE — 97110 THERAPEUTIC EXERCISES: CPT

## 2017-08-03 PROCEDURE — 97112 NEUROMUSCULAR REEDUCATION: CPT

## 2017-08-03 NOTE — PROGRESS NOTES
PT DAILY TREATMENT NOTE - [facility] 2-15    Patient Name: Paola Reyna  Date:8/3/2017  : 1985  [x]  Patient  Verified  Payor: Keri Bowels / Plan: Lara Licona PPO / Product Type: PPO /    In time: 12:35pm  Out time: 2:00pm  Total Treatment Time (min): 85  Total Timed Codes (min): 70  1:1 Treatment Time ( only): --   Visit #: 4    Treatment Area: Left knee pain [M25.562]    SUBJECTIVE  Pain Level (0-10 scale): 4/10  Any medication changes, allergies to medications, adverse drug reactions, diagnosis change, or new procedure performed?: [x] No    [] Yes (see summary sheet for update)  Subjective functional status/changes:   [] No changes reported  Pt admits to not wearing the brace at home. Pt complains of pain and continues to take prescription pain medication.       OBJECTIVE  Modality rationale: decrease inflammation and decrease pain to improve the patients ability to ambulate and perform functional activities with increased ease   Min Type Additional Details    [] Estim: []Att   []Unatt        []TENS instruct                  []IFC  []Premod   []NMES                     []Other:  []w/US   []w/ice   []w/heat  Position:  Location:    []  Traction: [] Cervical       []Lumbar                       [] Prone          []Supine                       []Intermittent   []Continuous Lbs:  [] before manual  [] after manual  []w/heat    []  Ultrasound: []Continuous   [] Pulsed at:                           []1MHz   []3MHz Location:  W/cm2:    [] Paraffin         Location:   []w/heat    []  Ice     []  Heat  []  Ice massage Position:  Location:    []  Laser  []  Other: Position:  Location:   15   [x]  Vasopneumatic Device Pressure:       [] lo [x] med [] hi   Temperature: 34  Location: left knee, with LEs supported on wedge, at end of session   [x] Skin assessment post-treatment:  [x]intact []redness- no adverse reaction    []redness  adverse reaction:     55 min Therapeutic Exercise:  [x] See flow sheet:   Rationale: increase ROM, increase strength, improve coordination and improve balance to improve the patients ability to ambulate and perform functional activities     15 min Neuromuscular Re-education:  [x]  See flow sheet: [place] electrical stimulation to right quad with active quad set   Rationale: increase ROM, increase strength and improve coordination  to improve quad activation and control          With   [] TE   [] TA   [] neuro   [] other: Patient Education: [x] Review HEP    [] Progressed/Changed HEP based on:   [] positioning   [] body mechanics   [] transfers   [] heat/ice application    [] other:      Other Objective/Functional Measures: --     Pain Level (0-10 scale) post treatment: 1/10    ASSESSMENT/Changes in Function:   Discussed the importance of compliance with the knee brace, even at home. Added prone TKE with which pt fatigued quickly; denied pain. Pt demonstrates improved quad control and performance of SLR exercises. Patient will continue to benefit from skilled PT services to modify and progress therapeutic interventions, address functional mobility deficits, address ROM deficits, address strength deficits, analyze and address soft tissue restrictions, analyze and cue movement patterns, analyze and modify body mechanics/ergonomics, assess and modify postural abnormalities and address imbalance to attain remaining goals. []  See Plan of Care  []  See progress note/recertification  []  See Discharge Summary         Progress towards goals / Updated goals:     Short Term Goals: To be accomplished in 6 weeks:  1) Pt will be Independent with HEP. MET  2) Pt will demonstrate knee extension >/= to 0 degrees ext to aid in ambulation. 3) Pt will be able to smbulate with more normalized gait pattern on level terrain. 4) Pt will demonstrate knee flexion >/= to 110 to aid in sitting.     Long Term Goals: To be accomplished in 12 weeks:  1) Pt will be able to navigate a flight of stairs without pain.    2) Pt will be able to ambulate on uneven terrain without pain or instability. 3) Pt will demonstrate knee flexion >/= 120 degrees to aid in sitting/squatting. 4) Pt will report improvement in overall functional mobility, as measured by FOTO, with an increased score of at least 30 points, from 26 to 56.      PLAN  [x]  Upgrade activities as tolerated     [x]  Continue plan of care  []  Update interventions per flow sheet       []  Discharge due to:_  []  Other:_      Gaby Mayer PT, DPT   8/3/2017  8:15 AM

## 2017-08-07 ENCOUNTER — HOSPITAL ENCOUNTER (OUTPATIENT)
Dept: PHYSICAL THERAPY | Age: 32
Discharge: HOME | End: 2017-08-07
Payer: COMMERCIAL

## 2017-08-07 PROCEDURE — 97016 VASOPNEUMATIC DEVICE THERAPY: CPT

## 2017-08-07 PROCEDURE — 97112 NEUROMUSCULAR REEDUCATION: CPT

## 2017-08-07 PROCEDURE — 97110 THERAPEUTIC EXERCISES: CPT

## 2017-08-07 NOTE — PROGRESS NOTES
PT DAILY TREATMENT NOTE - [facility] 2-15    Patient Name: Lawrence Jewell  Date:2017  : 1985  [x]  Patient  Verified  Payor: Sherri Maloney / Plan: Linnea Spencer PPO / Product Type: PPO /    In time: 1:00pm  Out time: 2:30pm  Total Treatment Time (min): 90  Total Timed Codes (min): 75  1:1 Treatment Time ( only): --   Visit #: 5    Treatment Area: Left knee pain [M25.562]    SUBJECTIVE  Pain Level (0-10 scale): 3/10  Any medication changes, allergies to medications, adverse drug reactions, diagnosis change, or new procedure performed?: [x] No    [] Yes (see summary sheet for update)  Subjective functional status/changes:   [] No changes reported  Pt had follow-up with Dr. Diaz Castro on Friday, 2017. Left knee hinge brace was unlocked to 90 degrees. Pt was given refill of pain medication and told to stop taking meloxicam and instead take ibuprofen for pain, as needed.     OBJECTIVE  Modality rationale: decrease inflammation and decrease pain to improve the patients ability to ambulate and perform functional activities with increased ease   Min Type Additional Details    [] Estim: []Att   []Unatt        []TENS instruct                  []IFC  []Premod   []NMES                     []Other:  []w/US   []w/ice   []w/heat  Position:  Location:    []  Traction: [] Cervical       []Lumbar                       [] Prone          []Supine                       []Intermittent   []Continuous Lbs:  [] before manual  [] after manual  []w/heat    []  Ultrasound: []Continuous   [] Pulsed at:                           []1MHz   []3MHz Location:  W/cm2:    [] Paraffin         Location:   []w/heat    []  Ice     []  Heat  []  Ice massage Position:  Location:    []  Laser  []  Other: Position:  Location:   15   [x]  Vasopneumatic Device Pressure:       [] lo [x] med [] hi   Temperature: 34  Location: left knee, with LEs supported on wedge, at end of session   [x] Skin assessment post-treatment:  [x]intact []redness- no adverse reaction    []redness  adverse reaction:     55 min Therapeutic Exercise:  [x] See flow sheet:   Rationale: increase ROM, increase strength, improve coordination and improve balance to improve the patients ability to ambulate and perform functional activities     15 min Neuromuscular Re-education:  [x]  See flow sheet: [place] electrical stimulation to right quad with active quad set   Rationale: increase ROM, increase strength and improve coordination  to improve quad activation and control          With   [] TE   [] TA   [] neuro   [] other: Patient Education: [x] Review HEP    [] Progressed/Changed HEP based on:   [] positioning   [] body mechanics   [] transfers   [] heat/ice application    [] other:      Other Objective/Functional Measures: --     Pain Level (0-10 scale) post treatment: 1/10    ASSESSMENT/Changes in Function:   Pt tolerated progression of strengthening exercises, including total gym squat (flexion limited to 70 degrees) and heel raise on step, without increase in left knee pain. Patient will continue to benefit from skilled PT services to modify and progress therapeutic interventions, address functional mobility deficits, address ROM deficits, address strength deficits, analyze and address soft tissue restrictions, analyze and cue movement patterns, analyze and modify body mechanics/ergonomics, assess and modify postural abnormalities and address imbalance to attain remaining goals. []  See Plan of Care  []  See progress note/recertification  []  See Discharge Summary         Progress towards goals / Updated goals:     Short Term Goals: To be accomplished in 6 weeks:  1) Pt will be Independent with HEP. MET  2) Pt will demonstrate knee extension >/= to 0 degrees ext to aid in ambulation. 3) Pt will be able to smbulate with more normalized gait pattern on level terrain. 4) Pt will demonstrate knee flexion >/= to 110 to aid in sitting.     Long Term Goals:  To be accomplished in 12 weeks:  1) Pt will be able to navigate a flight of stairs without pain. 2) Pt will be able to ambulate on uneven terrain without pain or instability. 3) Pt will demonstrate knee flexion >/= 120 degrees to aid in sitting/squatting. 4) Pt will report improvement in overall functional mobility, as measured by FOTO, with an increased score of at least 30 points, from 26 to 56.      PLAN  [x]  Upgrade activities as tolerated     [x]  Continue plan of care  []  Update interventions per flow sheet       []  Discharge due to:_  []  Other:_      Gloria Carrasco PT, DPT   8/7/2017  8:15 AM

## 2017-08-10 ENCOUNTER — APPOINTMENT (OUTPATIENT)
Dept: PHYSICAL THERAPY | Age: 32
End: 2017-08-10
Payer: COMMERCIAL

## 2017-08-14 ENCOUNTER — HOSPITAL ENCOUNTER (OUTPATIENT)
Dept: PHYSICAL THERAPY | Age: 32
End: 2017-08-14
Payer: COMMERCIAL

## 2017-08-16 ENCOUNTER — HOSPITAL ENCOUNTER (OUTPATIENT)
Dept: PHYSICAL THERAPY | Age: 32
Discharge: HOME | End: 2017-08-16
Payer: COMMERCIAL

## 2017-08-16 PROCEDURE — 97110 THERAPEUTIC EXERCISES: CPT

## 2017-08-16 PROCEDURE — 97016 VASOPNEUMATIC DEVICE THERAPY: CPT

## 2017-08-16 PROCEDURE — 97112 NEUROMUSCULAR REEDUCATION: CPT

## 2017-08-16 NOTE — PROGRESS NOTES
PT DAILY TREATMENT NOTE - [facility] 2-15    Patient Name: Jeanie Chapman  Date:2017  : 1985  [x]  Patient  Verified  Payor: Wandy Marina / Plan: Wang Walker PPO / Product Type: PPO /    In time: 12:30pm  Out time: 1:55pm  Total Treatment Time (min): 85  Total Timed Codes (min): 70  1:1 Treatment Time ( only): --   Visit #: 6    Treatment Area: Left knee pain [M25.562]    SUBJECTIVE  Pain Level (0-10 scale): 2/10  Any medication changes, allergies to medications, adverse drug reactions, diagnosis change, or new procedure performed?: [x] No    [] Yes (see summary sheet for update)  Subjective functional status/changes:   [] No changes reported  Pt reports \"forgetting\" his brace today. Pt states that pain in left knee is much better and he is down to taking 2 pain pills/day (1 in the morning and 1 at night).     OBJECTIVE  Modality rationale: decrease inflammation and decrease pain to improve the patients ability to ambulate and perform functional activities with increased ease   Min Type Additional Details    [] Estim: []Att   []Unatt        []TENS instruct                  []IFC  []Premod   []NMES                     []Other:  []w/US   []w/ice   []w/heat  Position:  Location:    []  Traction: [] Cervical       []Lumbar                       [] Prone          []Supine                       []Intermittent   []Continuous Lbs:  [] before manual  [] after manual  []w/heat    []  Ultrasound: []Continuous   [] Pulsed at:                           []1MHz   []3MHz Location:  W/cm2:    [] Paraffin         Location:   []w/heat    []  Ice     []  Heat  []  Ice massage Position:  Location:    []  Laser  []  Other: Position:  Location:   15   [x]  Vasopneumatic Device Pressure:       [] lo [x] med [] hi   Temperature: 34  Location: left knee, with LEs supported on wedge, at end of session   [x] Skin assessment post-treatment:  [x]intact []redness- no adverse reaction    []redness  adverse reaction:     60 min Therapeutic Exercise:  [x] See flow sheet: manual prone quad stretch   Rationale: increase ROM, increase strength, improve coordination and improve balance to improve the patients ability to ambulate and perform functional activities     10 min Neuromuscular Re-education:  [x]  See flow sheet: [place] electrical stimulation to right quad with active quad set   Rationale: increase ROM, increase strength and improve coordination  to improve quad activation and control          With   [] TE   [] TA   [] neuro   [] other: Patient Education: [x] Review HEP    [] Progressed/Changed HEP based on:   [] positioning   [] body mechanics   [] transfers   [] heat/ice application    [] other:      Other Objective/Functional Measures: --     Pain Level (0-10 scale) post treatment: 1/10    ASSESSMENT/Changes in Function:   Added elliptical today, which pt was able to perform without left knee pain but required awareness and effort for quad control. Patient will continue to benefit from skilled PT services to modify and progress therapeutic interventions, address functional mobility deficits, address ROM deficits, address strength deficits, analyze and address soft tissue restrictions, analyze and cue movement patterns, analyze and modify body mechanics/ergonomics, assess and modify postural abnormalities and address imbalance to attain remaining goals. []  See Plan of Care  []  See progress note/recertification  []  See Discharge Summary         Progress towards goals / Updated goals:     Short Term Goals: To be accomplished in 6 weeks:  1) Pt will be Independent with HEP. MET  2) Pt will demonstrate knee extension >/= to 0 degrees ext to aid in ambulation. 3) Pt will be able to smbulate with more normalized gait pattern on level terrain. 4) Pt will demonstrate knee flexion >/= to 110 to aid in sitting.       Long Term Goals: To be accomplished in 12 weeks:  1) Pt will be able to navigate a flight of stairs without pain.    2) Pt will be able to ambulate on uneven terrain without pain or instability. 3) Pt will demonstrate knee flexion >/= 120 degrees to aid in sitting/squatting. 4) Pt will report improvement in overall functional mobility, as measured by FOTO, with an increased score of at least 30 points, from 26 to 56.      PLAN  [x]  Upgrade activities as tolerated     [x]  Continue plan of care  []  Update interventions per flow sheet       []  Discharge due to:_  []  Other:_      Susan Mixon PT, DPT   8/16/2017  8:15 AM

## 2017-08-17 ENCOUNTER — HOSPITAL ENCOUNTER (OUTPATIENT)
Dept: PHYSICAL THERAPY | Age: 32
Discharge: HOME | End: 2017-08-17
Payer: COMMERCIAL

## 2017-08-17 PROCEDURE — 97016 VASOPNEUMATIC DEVICE THERAPY: CPT

## 2017-08-17 PROCEDURE — 97110 THERAPEUTIC EXERCISES: CPT

## 2017-08-17 PROCEDURE — 97112 NEUROMUSCULAR REEDUCATION: CPT

## 2017-08-17 PROCEDURE — 97012 MECHANICAL TRACTION THERAPY: CPT

## 2017-08-17 PROCEDURE — 97140 MANUAL THERAPY 1/> REGIONS: CPT

## 2017-08-17 NOTE — PROGRESS NOTES
PT DAILY TREATMENT NOTE - [facility] 2-15    Patient Name: Jaclyn Russ  Date:2017  : 1985  [x]  Patient  Verified  Payor: Evelyn Lozano / Plan: Radha Borrego PPO / Product Type: PPO /    In time: 12:25pm  Out time: 1:55pm  Total Treatment Time (min): 80  Total Timed Codes (min): 65  1:1 Treatment Time ( only): --   Visit #: 7    Treatment Area: Left knee pain [M25.562]    SUBJECTIVE  Pain Level (0-10 scale): 0/10  Any medication changes, allergies to medications, adverse drug reactions, diagnosis change, or new procedure performed?: [x] No    [] Yes (see summary sheet for update)  Subjective functional status/changes:   [] No changes reported  Pt states that left knee pain has been better since last PT session.     OBJECTIVE  Modality rationale: decrease inflammation and decrease pain to improve the patients ability to ambulate and perform functional activities with increased ease   Min Type Additional Details    [] Estim: []Att   []Unatt        []TENS instruct                  []IFC  []Premod   []NMES                     []Other:  []w/US   []w/ice   []w/heat  Position:  Location:    []  Traction: [] Cervical       []Lumbar                       [] Prone          []Supine                       []Intermittent   []Continuous Lbs:  [] before manual  [] after manual  []w/heat    []  Ultrasound: []Continuous   [] Pulsed at:                           []1MHz   []3MHz Location:  W/cm2:    [] Paraffin         Location:   []w/heat    []  Ice     []  Heat  []  Ice massage Position:  Location:    []  Laser  []  Other: Position:  Location:   15   [x]  Vasopneumatic Device Pressure:       [] lo [x] med [] hi   Temperature: 34  Location: left knee, with LEs supported on wedge, at end of session   [x] Skin assessment post-treatment:  [x]intact []redness- no adverse reaction    []redness  adverse reaction:     55 min Therapeutic Exercise:  [x] See flow sheet: manual prone quad stretch   Rationale: increase ROM, increase strength, improve coordination and improve balance to improve the patients ability to ambulate and perform functional activities     10 min Neuromuscular Re-education:  [x]  See flow sheet   Rationale: increase ROM, increase strength and improve coordination  to improve quad activation and control          With   [] TE   [] TA   [] neuro   [] other: Patient Education: [x] Review HEP    [] Progressed/Changed HEP based on:   [] positioning   [] body mechanics   [] transfers   [] heat/ice application    [] other:      Other Objective/Functional Measures: --     Pain Level (0-10 scale) post treatment: 0/10    ASSESSMENT/Changes in Function:   Pt demonstrates improved quad control and performance of exercises, without pain. Patient will continue to benefit from skilled PT services to modify and progress therapeutic interventions, address functional mobility deficits, address ROM deficits, address strength deficits, analyze and address soft tissue restrictions, analyze and cue movement patterns, analyze and modify body mechanics/ergonomics, assess and modify postural abnormalities and address imbalance to attain remaining goals. []  See Plan of Care  []  See progress note/recertification  []  See Discharge Summary         Progress towards goals / Updated goals:     Short Term Goals: To be accomplished in 6 weeks:  1) Pt will be Independent with HEP. MET  2) Pt will demonstrate knee extension >/= to 0 degrees ext to aid in ambulation. 3) Pt will be able to smbulate with more normalized gait pattern on level terrain. 4) Pt will demonstrate knee flexion >/= to 110 to aid in sitting.       Long Term Goals: To be accomplished in 12 weeks:  1) Pt will be able to navigate a flight of stairs without pain. 2) Pt will be able to ambulate on uneven terrain without pain or instability. 3) Pt will demonstrate knee flexion >/= 120 degrees to aid in sitting/squatting.   4) Pt will report improvement in overall functional mobility, as measured by FOTO, with an increased score of at least 30 points, from 26 to 56.      PLAN  [x]  Upgrade activities as tolerated     [x]  Continue plan of care  []  Update interventions per flow sheet       []  Discharge due to:_  []  Other:_      Jessie Domínguez, PT, DPT   8/17/2017  8:15 AM

## 2017-08-21 ENCOUNTER — HOSPITAL ENCOUNTER (OUTPATIENT)
Dept: PHYSICAL THERAPY | Age: 32
Discharge: HOME | End: 2017-08-21
Payer: COMMERCIAL

## 2017-08-21 PROCEDURE — 97140 MANUAL THERAPY 1/> REGIONS: CPT

## 2017-08-21 PROCEDURE — 97110 THERAPEUTIC EXERCISES: CPT

## 2017-08-21 NOTE — PROGRESS NOTES
PT DAILY TREATMENT NOTE - [facility] 2-15    Patient Name: Serafin Davenport  Date:2017  : 1985  [x]  Patient  Verified  Payor: Thien Saeed / Plan: Linda Ybarra PPO / Product Type: PPO /    In time: 1:15pm  Out time: 2:20pm  Total Treatment Time (min): 65  Total Timed Codes (min): 65  1:1 Treatment Time ( only): --   Visit #: 8    Treatment Area: Left knee pain [M25.562]    SUBJECTIVE  Pain Level (0-10 scale): 0/10  Any medication changes, allergies to medications, adverse drug reactions, diagnosis change, or new procedure performed?: [x] No    [] Yes (see summary sheet for update)  Subjective functional status/changes:   [] No changes reported  Pt reports working out at [facility] over the weekend and did the elliptical for 30 minutes without left knee pain. OBJECTIVE  60 min Therapeutic Exercise:  [x] See flow sheet: manual prone quad stretch   Rationale: increase ROM, increase strength, improve coordination and improve balance to improve the patients ability to ambulate and perform functional activities    10 min Manual Therapy: PROM left knee flexion an extension, to pt's tolerance   Rationale: decrease pain, increase ROM and increase tissue extensibility to improve the patients ability to ambulate normally and perform functional transfers with increased ease    With   [] TE   [] TA   [] neuro   [] other: Patient Education: [x] Review HEP    [] Progressed/Changed HEP based on:   [] positioning   [] body mechanics   [] transfers   [] heat/ice application    [] other:      Other Objective/Functional Measures: --     Pain Level (0-10 scale) post treatment: 0/10    ASSESSMENT/Changes in Function:   Pt is progressing well with quadriceps and hip strengthening exercises.   Patient will continue to benefit from skilled PT services to modify and progress therapeutic interventions, address functional mobility deficits, address ROM deficits, address strength deficits, analyze and address soft tissue restrictions, analyze and cue movement patterns, analyze and modify body mechanics/ergonomics, assess and modify postural abnormalities and address imbalance to attain remaining goals. []  See Plan of Care  []  See progress note/recertification  []  See Discharge Summary         Progress towards goals / Updated goals:     Short Term Goals: To be accomplished in 6 weeks:  1) Pt will be Independent with HEP. MET  2) Pt will demonstrate knee extension >/= to 0 degrees ext to aid in ambulation. 3) Pt will be able to smbulate with more normalized gait pattern on level terrain. 4) Pt will demonstrate knee flexion >/= to 110 to aid in sitting.       Long Term Goals: To be accomplished in 12 weeks:  1) Pt will be able to navigate a flight of stairs without pain. 2) Pt will be able to ambulate on uneven terrain without pain or instability. 3) Pt will demonstrate knee flexion >/= 120 degrees to aid in sitting/squatting. 4) Pt will report improvement in overall functional mobility, as measured by FOTO, with an increased score of at least 30 points, from 26 to 56.      PLAN  [x]  Upgrade activities as tolerated     [x]  Continue plan of care  []  Update interventions per flow sheet       []  Discharge due to:_  []  Other:_      Kristin King, PT, DPT   8/21/2017  8:15 AM

## 2017-08-24 ENCOUNTER — HOSPITAL ENCOUNTER (OUTPATIENT)
Dept: PHYSICAL THERAPY | Age: 32
Discharge: HOME | End: 2017-08-24
Payer: COMMERCIAL

## 2017-08-24 PROCEDURE — 97110 THERAPEUTIC EXERCISES: CPT

## 2017-08-24 PROCEDURE — 97140 MANUAL THERAPY 1/> REGIONS: CPT

## 2017-08-24 NOTE — PROGRESS NOTES
PT DAILY TREATMENT NOTE - [facility] 2-15    Patient Name: Jana Cardona  Date:2017  : 1985  [x]  Patient  Verified  Payor: Unique Soraida / Plan: Ghassan Pelletier PPO / Product Type: PPO /    In time: 1:00pm  Out time: 2:10pm  Total Treatment Time (min): 70  Total Timed Codes (min):  60  1:1 Treatment Time ( only): --   Visit #: 9    Treatment Area: Left knee pain [M25.562]    SUBJECTIVE  Pain Level (0-10 scale): 0/10  Any medication changes, allergies to medications, adverse drug reactions, diagnosis change, or new procedure performed?: [x] No    [] Yes (see summary sheet for update)  Subjective functional status/changes:   [] No changes reported  Pt reports working out at [facility] over the weekend and did the elliptical for 30 minutes without left knee pain.     OBJECTIVE  Modality rationale: decrease inflammation and decrease pain to improve the patients ability to ambulate and perform functional activities with increased ease   Min Type Additional Details    [] Estim: []Att   []Unatt        []TENS instruct                  []IFC  []Premod   []NMES                     []Other:  []w/US   []w/ice   []w/heat  Position:  Location:    []  Traction: [] Cervical       []Lumbar                       [] Prone          []Supine                       []Intermittent   []Continuous Lbs:  [] before manual  [] after manual  []w/heat    []  Ultrasound: []Continuous   [] Pulsed at:                           []1MHz   []3MHz Location:  W/cm2:    [] Paraffin         Location:   []w/heat   10 [x]  Ice     []  Heat  []  Ice massage Position: supine  Location: left knee at end of session    []  Laser  []  Other: Position:  Location:      []  Vasopneumatic Device Pressure:       [] lo [] med [] hi   Temperature:    [x] Skin assessment post-treatment:  [x]intact []redness- no adverse reaction    []redness  adverse reaction:     50 min Therapeutic Exercise:  [x] See flow sheet: manual prone quad stretch   Rationale: increase ROM, increase strength, improve coordination and improve balance to improve the patients ability to ambulate and perform functional activities    10 min Manual Therapy: PROM left knee flexion an extension, to pt's tolerance   Rationale: decrease pain, increase ROM and increase tissue extensibility to improve the patients ability to ambulate normally and perform functional transfers with increased ease    With   [] TE   [] TA   [] neuro   [] other: Patient Education: [x] Review HEP    [] Progressed/Changed HEP based on:   [] positioning   [] body mechanics   [] transfers   [] heat/ice application    [] other:      Other Objective/Functional Measures: --     Pain Level (0-10 scale) post treatment: 0/10    ASSESSMENT/Changes in Function:   []  See Plan of Care  [x]  See progress note/recertification  []  See Discharge Summary         Short Term Goals: To be accomplished in 6 weeks:  1) Pt will be Independent with HEP. MET  2) Pt will demonstrate knee extension >/= to 0 degrees ext to aid in ambulation. MET  3) Pt will be able to smbulate with more normalized gait pattern on level terrain.  MET  4) Pt will demonstrate knee flexion >/= to 110 to aid in sitting. MET      Long Term Goals: To be accomplished in 12 weeks:  1) Pt will be able to navigate a flight of stairs without pain.  progressing  2) Pt will be able to ambulate on uneven terrain without pain or instability. progressing  3) Pt will demonstrate knee flexion >/= 120 degrees to aid in sitting/squatting. progressing  4) Pt will report improvement in overall functional mobility, as measured by FOTO, with an increased score of at least 30 points, from 26 to 56.   NOT ASSESSED    PLAN  [x]  Upgrade activities as tolerated     [x]  Continue plan of care  []  Update interventions per flow sheet       []  Discharge due to:_  []  Other:_      Behzad Samano, PT, DPT   8/24/2017  8:15 AM

## 2017-08-28 ENCOUNTER — HOSPITAL ENCOUNTER (OUTPATIENT)
Dept: PHYSICAL THERAPY | Age: 32
Discharge: HOME | End: 2017-08-28
Payer: COMMERCIAL

## 2017-08-28 PROCEDURE — 97112 NEUROMUSCULAR REEDUCATION: CPT

## 2017-08-28 PROCEDURE — 97110 THERAPEUTIC EXERCISES: CPT

## 2017-08-28 PROCEDURE — 97140 MANUAL THERAPY 1/> REGIONS: CPT

## 2017-08-28 NOTE — PROGRESS NOTES
[facility]  [facility] ([facility]), [address]  Phone: [phone] Fax: [phone]    Progress Note    Name: Hermelinda Payne   : 1985   MD: Prashant Elizabeth MD       Treatment Diagnosis: Left knee pain [M25.562]  Start of Care: 2017    Visits from Start of Care: 9  Missed Visits: 0    Summary of Care: Pt has participated in 9 outpatient PT sessions, 2017-2017, s/p left tibial tubercle osteotomy and arthroscopic microfracture procedure to trochlea, 2017. Treatment has included therapeutic exercise, manual therapy, vaso-pneumatic compression with ice, pt education and home exercise program.      Assessment / Recommendations: Pt is progressing nicely with quad strength and control. Pt took himself out of the brace 2 weeks ago. Pt complains of continued anterior knee pain, daily. Pt educated and cautioned about over doing it. Pt continues to ambulate with limp, but much improved. Left knee ROM has improved since initial visit: Initial eval: AROM -/5/60 degrees; currently: 0/0/105 degrees. Recommend continued outpatient PT 2x/week x at least 2 weeks to maximize rehab potential and achieve PT goals. Short Term Goals: To be accomplished in 6 weeks:  1) Pt will be Independent with HEP. MET  2) Pt will demonstrate knee extension >/= to 0 degrees ext to aid in ambulation. MET  3) Pt will be able to smbulate with more normalized gait pattern on level terrain.  MET  4) Pt will demonstrate knee flexion >/= to 110 to aid in sitting. MET      Long Term Goals: To be accomplished in 12 weeks:  1) Pt will be able to navigate a flight of stairs without pain.  progressing  2) Pt will be able to ambulate on uneven terrain without pain or instability. progressing  3) Pt will demonstrate knee flexion >/= 120 degrees to aid in sitting/squatting.  progressing  4) Pt will report improvement in overall functional mobility, as measured by FOTO, with an increased score of at least 30 points, from 26 to 56. NOT ASSESSED    Olivia Evans, PT, DPT    8/28/2017 11:41 AM    ________________________________________________________________________  NOTE TO PHYSICIAN:  Please complete the following and fax to: [facility]: [phone]  Retain this original for your records. If you are unable to process this request in 24 hours, please contact our office.        ____ I have read the above report and request that my patient continue therapy with the following changes/special instructions:  ____ I have read the above report and request that my patient be discharged from therapy    Physician's Signature:_________________ Date:___________Time:__________

## 2017-08-28 NOTE — PROGRESS NOTES
PT DAILY TREATMENT NOTE - [facility] 2-15    Patient Name: Lucia Esposito  Date:2017  : 1985  [x]  Patient  Verified  Payor: Diana Hernandez / Plan: Fernando Regan PPO / Product Type: PPO /    In time: 12:40pm  Out time: 2:00pm  Total Treatment Time (min): 80  Total Timed Codes (min): 70  1:1 Treatment Time ( only): --   Visit #: 10    Treatment Area: Left knee pain [M25.562]    SUBJECTIVE  Pain Level (0-10 scale): 1/10  Any medication changes, allergies to medications, adverse drug reactions, diagnosis change, or new procedure performed?: [x] No    [] Yes (see summary sheet for update)  Subjective functional status/changes:   [] No changes reported  Pt had follow-up with Dr. Zaida Colindres last week; planned to hold off return to work until tomorrow. 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     OBJECTIVE  Modality rationale: decrease inflammation and decrease pain to improve the patients ability to return to PLOF   Min Type Additional Details    [] Estim: []Att   []Unatt        []TENS instruct                  []IFC  []Premod   []NMES                     []Other:  []w/US   []w/ice   []w/heat  Position:  Location:    []  Traction: [] Cervical       []Lumbar                       [] Prone          []Supine                       []Intermittent   []Continuous Lbs:  [] before manual  [] after manual  []w/heat    []  Ultrasound: []Continuous   [] Pulsed at:                           []1MHz   []3MHz Location:  W/cm2:    [] Paraffin         Location:   []w/heat   10 [x]  Ice     []  Heat  []  Ice massage Position: supine with left LE elevated on wedge   Location: left knee at end of session    []  Laser  []  Other: Position:  Location:      []  Vasopneumatic Device Pressure:       [] lo [] med [] hi   Temperature:    [x] Skin assessment post-treatment:  [x]intact []redness- no adverse reaction    []redness  adverse reaction:     45 min Therapeutic Exercise:  [x] See flow sheet: manual prone quad stretch   Rationale: increase ROM, increase strength, improve coordination and improve balance to improve the patients ability to ambulate and perform functional activities    15 min Neuromuscular Re-education:  [x]  See flow sheet : SLR flexion performed with [place] stimulation to left quad 5\"/5\"   Rationale: increase strength and improve coordination  to improve the patients ability to ambulate normally and and improve quad control    10 min Manual Therapy: PROM left knee flexion and extension, to pt's tolerance; prone quad stretch; STM left hamstring and gastroc with prone hang   Rationale: decrease pain, increase ROM and increase tissue extensibility to improve the patients ability to ambulate normally and perform functional transfers with increased ease    With   [] TE   [] TA   [] neuro   [] other: Patient Education: [x] Review HEP    [] Progressed/Changed HEP based on:   [] positioning   [] body mechanics   [] transfers   [] heat/ice application    [] other:      Other Objective/Functional Measures: --     Pain Level (0-10 scale) post treatment: 0/10    ASSESSMENT/Changes in Function:   Treatment focused on quad and gluteal strengthening. Pt fatigued very quickly with SLR flexion while [place] stimulation applied to left quad; performed to improve strength and quad control. Pt denied pain. Patient will continue to benefit from skilled PT services to modify and progress therapeutic interventions, address functional mobility deficits, address ROM deficits, address strength deficits, analyze and address soft tissue restrictions, analyze and cue movement patterns, analyze and modify body mechanics/ergonomics, assess and modify postural abnormalities and address imbalance to attain remaining goals. []  See Plan of Care  []  See progress note/recertification  []  See Discharge Summary         Progress towards goals / Updated goals:     Short Term Goals:  To be accomplished in 6 weeks:  1) Pt will be Independent with HEP. MET  2) Pt will demonstrate knee extension >/= to 0 degrees ext to aid in ambulation. 3) Pt will be able to smbulate with more normalized gait pattern on level terrain. 4) Pt will demonstrate knee flexion >/= to 110 to aid in sitting.       Long Term Goals: To be accomplished in 12 weeks:  1) Pt will be able to navigate a flight of stairs without pain. 2) Pt will be able to ambulate on uneven terrain without pain or instability. 3) Pt will demonstrate knee flexion >/= 120 degrees to aid in sitting/squatting. 4) Pt will report improvement in overall functional mobility, as measured by FOTO, with an increased score of at least 30 points, from 26 to 56.      PLAN  [x]  Upgrade activities as tolerated     [x]  Continue plan of care  []  Update interventions per flow sheet       []  Discharge due to:_  []  Other:_      Goldy Chaudhry, PT, DPT   8/28/2017  8:15 AM

## 2017-08-30 ENCOUNTER — APPOINTMENT (OUTPATIENT)
Dept: PHYSICAL THERAPY | Age: 32
End: 2017-08-30
Payer: COMMERCIAL

## 2017-09-05 ENCOUNTER — HOSPITAL ENCOUNTER (OUTPATIENT)
Dept: PHYSICAL THERAPY | Age: 32
Discharge: HOME | End: 2017-09-05
Payer: COMMERCIAL

## 2017-09-05 PROCEDURE — 97140 MANUAL THERAPY 1/> REGIONS: CPT

## 2017-09-05 PROCEDURE — 97112 NEUROMUSCULAR REEDUCATION: CPT

## 2017-09-05 PROCEDURE — 97110 THERAPEUTIC EXERCISES: CPT

## 2017-09-05 NOTE — PROGRESS NOTES
PT DAILY TREATMENT NOTE - [facility] 2-15    Patient Name: Jailyn Malcolm  Date:2017  : 1985  [x]  Patient  Verified  Payor: Juanjose Hassan / Plan: Alicia Araujo PPO / Product Type: PPO /    In time: 7:45m  Out time: 9:00am  Total Treatment Time (min): 75  Total Timed Codes (min): 65  1:1 Treatment Time ( only): --   Visit #: 11    Treatment Area: Left knee pain [M25.562]    SUBJECTIVE  Pain Level (0-10 scale): 1/10  Any medication changes, allergies to medications, adverse drug reactions, diagnosis change, or new procedure performed?: [x] No    [] Yes (see summary sheet for update)  Subjective functional status/changes:   [] No changes reported  Pt has returned to work and feels that he is managing well.     OBJECTIVE  Modality rationale: decrease inflammation and decrease pain to improve the patients ability to return to PLOF   Min Type Additional Details    [] Estim: []Att   []Unatt        []TENS instruct                  []IFC  []Premod   []NMES                     []Other:  []w/US   []w/ice   []w/heat  Position:  Location:    []  Traction: [] Cervical       []Lumbar                       [] Prone          []Supine                       []Intermittent   []Continuous Lbs:  [] before manual  [] after manual  []w/heat    []  Ultrasound: []Continuous   [] Pulsed at:                           []1MHz   []3MHz Location:  W/cm2:    [] Paraffin         Location:   []w/heat   10 [x]  Ice     []  Heat  []  Ice massage Position: supine with left LE elevated on wedge   Location: left knee at end of session    []  Laser  []  Other: Position:  Location:      []  Vasopneumatic Device Pressure:       [] lo [] med [] hi   Temperature:    [x] Skin assessment post-treatment:  [x]intact []redness- no adverse reaction    []redness  adverse reaction:     35 min Therapeutic Exercise:  [x] See flow sheet: manual prone quad stretch   Rationale: increase ROM, increase strength, improve coordination and improve balance to improve the patients ability to ambulate and perform functional activities    10 min Neuromuscular Re-education:  [x]  See flow sheet : SLR flexion performed with [place] stimulation to left quad 5\"/5\"   Rationale: increase strength and improve coordination  to improve the patients ability to ambulate normally and and improve quad control    10 min Manual Therapy: PROM left knee flexion and extension, to pt's tolerance; prone quad stretch; STM left hamstring and gastroc with prone hang   Rationale: decrease pain, increase ROM and increase tissue extensibility to improve the patients ability to ambulate normally and perform functional transfers with increased ease    With   [] TE   [] TA   [] neuro   [] other: Patient Education: [x] Review HEP    [] Progressed/Changed HEP based on:   [] positioning   [] body mechanics   [] transfers   [] heat/ice application    [] other:      Other Objective/Functional Measures: --     Pain Level (0-10 scale) post treatment: 0/10    ASSESSMENT/Changes in Function:    []  See Plan of Care  [x]  See progress note/recertification  []  See Discharge Summary         Progress towards goals / Updated goals:  Short Term Goals: To be accomplished in 6 weeks:  1) Pt will be Independent with HEP. MET  2) Pt will demonstrate knee extension >/= to 0 degrees ext to aid in ambulation. MET  3) Pt will be able to ambulate with more normalized gait pattern on level terrain. MET  4) Pt will demonstrate knee flexion >/= to 110 to aid in sitting. progressing      Long Term Goals: To be accomplished in 12 weeks:  1) Pt will be able to navigate a flight of stairs without pain. MET  2) Pt will be able to ambulate on uneven terrain without pain or instability. progressing  3) Pt will demonstrate knee flexion >/= 120 degrees to aid in sitting/squatting. progressing  4) Pt will report improvement in overall functional mobility, as measured by FOTO, with an increased score of at least 30 points, from 26 to 56. progressing    PLAN  [x]  Upgrade activities as tolerated     [x]  Continue plan of care  []  Update interventions per flow sheet       []  Discharge due to:_  []  Other:_      Susan Mixon PT, DPT   9/5/2017  8:15 AM

## 2017-09-07 ENCOUNTER — HOSPITAL ENCOUNTER (OUTPATIENT)
Dept: PHYSICAL THERAPY | Age: 32
Discharge: HOME | End: 2017-09-07
Payer: COMMERCIAL

## 2017-09-07 PROCEDURE — 97112 NEUROMUSCULAR REEDUCATION: CPT

## 2017-09-07 PROCEDURE — 97140 MANUAL THERAPY 1/> REGIONS: CPT

## 2017-09-07 PROCEDURE — 97110 THERAPEUTIC EXERCISES: CPT

## 2017-09-07 NOTE — PROGRESS NOTES
PT DAILY TREATMENT NOTE - [facility] 2-15    Patient Name: Tea Whitaker  Date:2017  : 1985  [x]  Patient  Verified  Payor: Jose Sosa / Plan: Render Hinojosa PPO / Product Type: PPO /    In time: 7:15m  Out time: 8:30am  Total Treatment Time (min): 75  Total Timed Codes (min): 65  1:1 Treatment Time ( only): --   Visit #: 12    Treatment Area: Left knee pain [M25.562]    SUBJECTIVE  Pain Level (0-10 scale): 0/10  Any medication changes, allergies to medications, adverse drug reactions, diagnosis change, or new procedure performed?: [x] No    [] Yes (see summary sheet for update)  Subjective functional status/changes:   [] No changes reported  Pt had a MD appointment on Tuesday but had to cancel due to family obligations. Pt states that he is taking 800mg ibuprofen 3x/day with Pepcid and 2 tramadol at night. 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       OBJECTIVE  Modality rationale: decrease inflammation and decrease pain to improve the patients ability to return to PLOF   Min Type Additional Details    [] Estim: []Att   []Unatt        []TENS instruct                  []IFC  []Premod   []NMES                     []Other:  []w/US   []w/ice   []w/heat  Position:  Location:    []  Traction: [] Cervical       []Lumbar                       [] Prone          []Supine                       []Intermittent   []Continuous Lbs:  [] before manual  [] after manual  []w/heat    []  Ultrasound: []Continuous   [] Pulsed at:                           []1MHz   []3MHz Location:  W/cm2:    [] Paraffin         Location:   []w/heat   10 [x]  Ice     []  Heat  []  Ice massage Position: supine with left LE elevated on wedge   Location: left knee at end of session    []  Laser  []  Other: Position:  Location:      []  Vasopneumatic Device Pressure:       [] lo [] med [] hi   Temperature:    [x] Skin assessment post-treatment:  [x]intact []redness- no adverse reaction    []redness  adverse reaction: 45 min Therapeutic Exercise:  [x] See flow sheet: manual prone quad stretch   Rationale: increase ROM, increase strength, improve coordination and improve balance to improve the patients ability to ambulate and perform functional activities    10 min Neuromuscular Re-education:  [x]  See flow sheet : SLR flexion performed with [place] stimulation to left quad 5\"/5\"   Rationale: increase strength and improve coordination  to improve the patients ability to ambulate normally and and improve quad control    10 min Manual Therapy:  Foam roll to left ITB; deep tissue mobilization to left ITB with pt in hook-lying position. Rationale: decrease pain, increase ROM and increase tissue extensibility to improve the patients ability to ambulate normally and perform functional transfers with increased ease    With   [] TE   [] TA   [] neuro   [] other: Patient Education: [x] Review HEP    [] Progressed/Changed HEP based on:   [] positioning   [] body mechanics   [] transfers   [] heat/ice application    [] other:      Other Objective/Functional Measures: --     Pain Level (0-10 scale) post treatment: 0/10    ASSESSMENT/Changes in Function:    Noted improvement in appearance of swelling today. Pt denied pain with exercises. Patient will continue to benefit from skilled PT services to modify and progress therapeutic interventions, address functional mobility deficits, address ROM deficits, address strength deficits, analyze and address soft tissue restrictions, analyze and cue movement patterns, analyze and modify body mechanics/ergonomics, assess and modify postural abnormalities and address imbalance to attain remaining goals. []  See Plan of Care  []  See progress note/recertification  []  See Discharge Summary         Progress towards goals / Updated goals:  Short Term Goals: To be accomplished in 6 weeks:  1) Pt will be Independent with HEP.  MET  2) Pt will demonstrate knee extension >/= to 0 degrees ext to aid in ambulation. MET  3) Pt will be able to ambulate with more normalized gait pattern on level terrain. MET  4) Pt will demonstrate knee flexion >/= to 110 to aid in sitting. progressing      Long Term Goals: To be accomplished in 12 weeks:  1) Pt will be able to navigate a flight of stairs without pain. MET  2) Pt will be able to ambulate on uneven terrain without pain or instability. progressing  3) Pt will demonstrate knee flexion >/= 120 degrees to aid in sitting/squatting. progressing  4) Pt will report improvement in overall functional mobility, as measured by FOTO, with an increased score of at least 30 points, from 26 to 56.  progressing    PLAN  [x]  Upgrade activities as tolerated     [x]  Continue plan of care  []  Update interventions per flow sheet       []  Discharge due to:_  []  Other:_      Behzad Samano, PT, DPT   9/7/2017  8:15 AM

## 2017-09-14 ENCOUNTER — HOSPITAL ENCOUNTER (OUTPATIENT)
Dept: PHYSICAL THERAPY | Age: 32
Discharge: HOME | End: 2017-09-14
Payer: COMMERCIAL

## 2017-09-14 PROCEDURE — 97110 THERAPEUTIC EXERCISES: CPT

## 2017-09-14 PROCEDURE — 97112 NEUROMUSCULAR REEDUCATION: CPT

## 2017-09-14 PROCEDURE — 97016 VASOPNEUMATIC DEVICE THERAPY: CPT

## 2017-09-14 PROCEDURE — 97140 MANUAL THERAPY 1/> REGIONS: CPT

## 2017-09-14 NOTE — PROGRESS NOTES
PT DAILY TREATMENT NOTE - [facility] 2-15    Patient Name: Jailyn Malcolm  Date:2017  : 1985  [x]  Patient  Verified  Payor: Juanjose Hassan / Plan: Alicia Araujo PPO / Product Type: PPO /    In time: 7:10m  Out time: 8:20am  Total Treatment Time (min): 70  Total Timed Codes (min): 60  1:1 Treatment Time ( only): --   Visit #: 13    Treatment Area: Left knee pain [M25.562]    SUBJECTIVE  Pain Level (0-10 scale): 0/10  Any medication changes, allergies to medications, adverse drug reactions, diagnosis change, or new procedure performed?: [x] No    [] Yes (see summary sheet for update)  Subjective functional status/changes:   [] No changes reported  Pt saw Dr. Keyanna Garcia on Friday and was given refill for pain medication. Pt states that he his doing better at work and has been going to the gym, doing the elliptical and recumbent bike without increase in pain. Pt feels that swelling is better.     OBJECTIVE  Modality rationale: decrease inflammation and decrease pain to improve the patients ability to return to PLOF   Min Type Additional Details    [] Estim: []Att   []Unatt        []TENS instruct                  []IFC  []Premod   []NMES                     []Other:  []w/US   []w/ice   []w/heat  Position:  Location:    []  Traction: [] Cervical       []Lumbar                       [] Prone          []Supine                       []Intermittent   []Continuous Lbs:  [] before manual  [] after manual  []w/heat    []  Ultrasound: []Continuous   [] Pulsed at:                           []1MHz   []3MHz Location:  W/cm2:    [] Paraffin         Location:   []w/heat   10 [x]  Ice     []  Heat  []  Ice massage Position: supine with left LE elevated on wedge   Location: left knee at end of session    []  Laser  []  Other: Position:  Location:      []  Vasopneumatic Device Pressure:       [] lo [] med [] hi   Temperature:    [x] Skin assessment post-treatment:  [x]intact []redness- no adverse reaction    []redness  adverse reaction:     25 min Therapeutic Exercise:  [x] See flow sheet: manual prone quad stretch   Rationale: increase ROM, increase strength, improve coordination and improve balance to improve the patients ability to ambulate and perform functional activities    25 min Neuromuscular Re-education:  [x]  See flow sheet : SLR flexion performed with [place] stimulation to left quad 5\"/5\"   Rationale: increase strength and improve coordination  to improve the patients ability to ambulate normally and and improve quad control    10 min Manual Therapy:  Foam roll to left ITB; prone left quad stretch   Rationale: decrease pain, increase ROM and increase tissue extensibility to improve the patients ability to ambulate normally and perform functional transfers with increased ease    With   [] TE   [] TA   [] neuro   [] other: Patient Education: [x] Review HEP    [] Progressed/Changed HEP based on:   [] positioning   [] body mechanics   [] transfers   [] heat/ice application    [] other:      Other Objective/Functional Measures: --     Pain Level (0-10 scale) post treatment: 0/10    ASSESSMENT/Changes in Function:    Challenged pt with dynamic single limb balance exercise and agility ladder. Pt struggled with single limb dip; needs continued quad strengthening, especially for eccentric control. Patient will continue to benefit from skilled PT services to modify and progress therapeutic interventions, address functional mobility deficits, address ROM deficits, address strength deficits, analyze and address soft tissue restrictions, analyze and cue movement patterns, analyze and modify body mechanics/ergonomics, assess and modify postural abnormalities and address imbalance to attain remaining goals. []  See Plan of Care  []  See progress note/recertification  []  See Discharge Summary         Progress towards goals / Updated goals:  Short Term Goals: To be accomplished in 6 weeks:  1) Pt will be Independent with HEP.  MET  2) Pt will demonstrate knee extension >/= to 0 degrees ext to aid in ambulation. MET  3) Pt will be able to ambulate with more normalized gait pattern on level terrain. MET  4) Pt will demonstrate knee flexion >/= to 110 to aid in sitting. progressing      Long Term Goals: To be accomplished in 12 weeks:  1) Pt will be able to navigate a flight of stairs without pain. MET  2) Pt will be able to ambulate on uneven terrain without pain or instability. progressing  3) Pt will demonstrate knee flexion >/= 120 degrees to aid in sitting/squatting. progressing  4) Pt will report improvement in overall functional mobility, as measured by FOTO, with an increased score of at least 30 points, from 26 to 56.  progressing    PLAN  [x]  Upgrade activities as tolerated     [x]  Continue plan of care  []  Update interventions per flow sheet       []  Discharge due to:_  []  Other:_      Susan iMxon PT, DPT   9/14/2017  8:15 AM

## 2017-09-18 ENCOUNTER — HOSPITAL ENCOUNTER (OUTPATIENT)
Dept: PHYSICAL THERAPY | Age: 32
Discharge: HOME | End: 2017-09-18
Payer: COMMERCIAL

## 2017-09-18 PROCEDURE — 97140 MANUAL THERAPY 1/> REGIONS: CPT

## 2017-09-18 PROCEDURE — 97112 NEUROMUSCULAR REEDUCATION: CPT

## 2017-09-18 PROCEDURE — 97110 THERAPEUTIC EXERCISES: CPT

## 2017-09-18 NOTE — PROGRESS NOTES
PT DAILY TREATMENT NOTE - [facility] 2-15    Patient Name: Hermelinda Payne  Date:2017  : 1985  [x]  Patient  Verified  Payor: Catina Lomeli / Plan: Jaylyn Bautista PPO / Product Type: PPO /    In time: 7:45m  Out time: 8:45am  Total Treatment Time (min): 60  Total Timed Codes (min): 60  1:1 Treatment Time ( only): --   Visit #: 14    Treatment Area: Left knee pain [M25.562]    SUBJECTIVE  Pain Level (0-10 scale): 0/10  Any medication changes, allergies to medications, adverse drug reactions, diagnosis change, or new procedure performed?: [x] No    [] Yes (see summary sheet for update)  Subjective functional status/changes:   [] No changes reported  [place] without increase in left knee pain. Pt states that since he has been working out at the gym (bike and elliptical), he has noticed correlated decrease in left knee pain.     OBJECTIVE  40 min Therapeutic Exercise:  [x] See flow sheet: manual prone quad stretch   Rationale: increase ROM, increase strength, improve coordination and improve balance to improve the patients ability to ambulate and perform functional activities    10 min Neuromuscular Re-education:  [x]  See flow sheet :   SLR flexion performed with [place] stimulation to left quad 5\"/5\" x 5 mins   Rationale: increase strength and improve coordination  to improve the patients ability to ambulate normally and and improve quad control    10 min Manual Therapy:  Foam roll to left ITB; prone left quad stretch   Rationale: decrease pain, increase ROM and increase tissue extensibility to improve the patients ability to ambulate normally and perform functional transfers with increased ease    With   [] TE   [] TA   [] neuro   [] other: Patient Education: [x] Review HEP    [] Progressed/Changed HEP based on:   [] positioning   [] body mechanics   [] transfers   [] heat/ice application    [] other:      Other Objective/Functional Measures: --     Pain Level (0-10 scale) post treatment: 0/10    ASSESSMENT/Changes in Function:    Pt denied modalities at end of session. Pt continues to struggle with eccentric quad control; instructed to add small-range single limb dips to HEP. Patient will continue to benefit from skilled PT services to modify and progress therapeutic interventions, address functional mobility deficits, address ROM deficits, address strength deficits, analyze and address soft tissue restrictions, analyze and cue movement patterns, analyze and modify body mechanics/ergonomics, assess and modify postural abnormalities and address imbalance to attain remaining goals. []  See Plan of Care  []  See progress note/recertification  []  See Discharge Summary         Progress towards goals / Updated goals:  Short Term Goals: To be accomplished in 6 weeks:  1) Pt will be Independent with HEP. MET  2) Pt will demonstrate knee extension >/= to 0 degrees ext to aid in ambulation. MET  3) Pt will be able to ambulate with more normalized gait pattern on level terrain. MET  4) Pt will demonstrate knee flexion >/= to 110 to aid in sitting. progressing      Long Term Goals: To be accomplished in 12 weeks:  1) Pt will be able to navigate a flight of stairs without pain. MET  2) Pt will be able to ambulate on uneven terrain without pain or instability. progressing  3) Pt will demonstrate knee flexion >/= 120 degrees to aid in sitting/squatting. progressing  4) Pt will report improvement in overall functional mobility, as measured by FOTO, with an increased score of at least 30 points, from 26 to 56.  progressing    PLAN  [x]  Upgrade activities as tolerated     [x]  Continue plan of care  []  Update interventions per flow sheet       []  Discharge due to:_  []  Other:_      Stacia Alcantar, PT, DPT   9/18/2017  8:15 AM

## 2017-09-21 ENCOUNTER — HOSPITAL ENCOUNTER (OUTPATIENT)
Dept: PHYSICAL THERAPY | Age: 32
Discharge: HOME | End: 2017-09-21
Payer: COMMERCIAL

## 2017-09-21 PROCEDURE — 97112 NEUROMUSCULAR REEDUCATION: CPT

## 2017-09-21 PROCEDURE — 97110 THERAPEUTIC EXERCISES: CPT

## 2017-09-21 NOTE — PROGRESS NOTES
PT DAILY TREATMENT NOTE - [facility] 2-15    Patient Name: Pamela Steinberg  Date:2017  : 1985  [x]  Patient  Verified  Payor: Ga Shannon / Plan: Mary Jackson PPO / Product Type: PPO /    In time: 7:15am  Out time: 8:30am  Total Treatment Time (min): 75  Total Timed Codes (min): 65  1:1 Treatment Time ( only): --   Visit #: 15    Treatment Area: Left knee pain [M25.562]    SUBJECTIVE  Pain Level (0-10 scale): 0/10  Any medication changes, allergies to medications, adverse drug reactions, diagnosis change, or new procedure performed?: [x] No    [] Yes (see summary sheet for update)  Subjective functional status/changes:   [] No changes reported  Pt states that he tried to quit taking the prescription pain medication, \"cold turkey\" and was very achy and restless all day yesterday. Pt feels that he still needs the pain medication but states that he is going to try taking just 1 pill/day instead of 2.      OBJECTIVE  Modality rationale: decrease inflammation and decrease pain to improve the patients ability to ambulate normally and perform functional and recreational activities   Min Type Additional Details    [] Estim: []Att   []Unatt        []TENS instruct                  []IFC  []Premod   []NMES                     []Other:  []w/US   []w/ice   []w/heat  Position:  Location:    []  Traction: [] Cervical       []Lumbar                       [] Prone          []Supine                       []Intermittent   []Continuous Lbs:  [] before manual  [] after manual  []w/heat    []  Ultrasound: []Continuous   [] Pulsed at:                           []1MHz   []3MHz Location:  W/cm2:    [] Paraffin         Location:   []w/heat   10 [x]  Ice     []  Heat  []  Ice massage Position: supine  Location: left knee at end of session    []  Laser  []  Other: Position:  Location:      []  Vasopneumatic Device Pressure:       [] lo [] med [] hi   Temperature:    [x] Skin assessment post-treatment:  [x]intact []redness- no adverse reaction    []redness  adverse reaction:     55 min Therapeutic Exercise:  [x] See flow sheet: manual prone quad stretch   Rationale: increase ROM, increase strength, improve coordination and improve balance to improve the patients ability to ambulate and perform functional activities    10 min Neuromuscular Re-education:  [x]  See flow sheet :   SLR flexion performed with [place] stimulation to left quad 5\"/5\" x 5 mins   Rationale: increase strength and improve coordination  to improve the patients ability to ambulate normally and and improve quad control    With   [] TE   [] TA   [] neuro   [] other: Patient Education: [x] Review HEP    [] Progressed/Changed HEP based on:   [] positioning   [] body mechanics   [] transfers   [] heat/ice application    [] other:      Other Objective/Functional Measures: --     Pain Level (0-10 scale) post treatment: 0/10    ASSESSMENT/Changes in Function:    Pt has follow-up with Dr. Arbie Kayser next week. Recommend continued PT 1-2x/week x 2 weeks. []  See Plan of Care  []  See progress note/recertification  []  See Discharge Summary         Progress towards goals / Updated goals:  Short Term Goals: To be accomplished in 6 weeks:  1) Pt will be Independent with HEP. MET  2) Pt will demonstrate knee extension >/= to 0 degrees ext to aid in ambulation. MET  3) Pt will be able to ambulate with more normalized gait pattern on level terrain. MET  4) Pt will demonstrate knee flexion >/= to 110 to aid in sitting. MET      Long Term Goals: To be accomplished in 12 weeks:  1) Pt will be able to navigate a flight of stairs without pain. MET  2) Pt will be able to ambulate on uneven terrain without pain or instability. MET  3) Pt will demonstrate knee flexion >/= 120 degrees to aid in sitting/squatting. progressing  4) Pt will report improvement in overall functional mobility, as measured by FOTO, with an increased score of at least 30 points, from 26 to 56.  progressing    PLAN  [x] Upgrade activities as tolerated     [x]  Continue plan of care  []  Update interventions per flow sheet       []  Discharge due to:_  []  Other:_      Mariana Piper, PT, DPT   9/21/2017  8:15 AM

## 2017-10-02 ENCOUNTER — HOSPITAL ENCOUNTER (OUTPATIENT)
Dept: PHYSICAL THERAPY | Age: 32
Discharge: HOME | End: 2017-10-02
Payer: COMMERCIAL

## 2017-10-02 PROCEDURE — 97110 THERAPEUTIC EXERCISES: CPT

## 2017-10-02 PROCEDURE — 97112 NEUROMUSCULAR REEDUCATION: CPT

## 2017-10-02 NOTE — PROGRESS NOTES
PT DAILY TREATMENT NOTE - [facility] 2-15    Patient Name: Arnoldo Stephens  Date:10/2/2017  : 1985  [x]  Patient  Verified  Payor: Kristal Zambrano / Plan: Bernie Gibson PPO / Product Type: PPO /    In time: 7:10am  Out time: 8:50am  Total Treatment Time (min): 100  Total Timed Codes (min): 90  1:1 Treatment Time ( only): --   Visit #: 16    Treatment Area: Left knee pain [M25.562]    SUBJECTIVE  Pain Level (0-10 scale): 0/10  Any medication changes, allergies to medications, adverse drug reactions, diagnosis change, or new procedure performed?: [x] No    [] Yes (see summary sheet for update)  Subjective functional status/changes:   [] No changes reported  Pt reports riding bicycle on [address] [place] x 18 miles 2 days; felt some soreness in left knee and realized that he was compensating with right LE during petaling uphill. Pt had follow-up with Dr. Marty Eubanks last week; plan to continue 2 x/week x 2 weeks.     OBJECTIVE  Modality rationale: decrease inflammation and decrease pain to improve the patients ability to ambulate normally and perform functional and recreational activities   Min Type Additional Details    [] Estim: []Att   []Unatt        []TENS instruct                  []IFC  []Premod   []NMES                     []Other:  []w/US   []w/ice   []w/heat  Position:  Location:    []  Traction: [] Cervical       []Lumbar                       [] Prone          []Supine                       []Intermittent   []Continuous Lbs:  [] before manual  [] after manual  []w/heat    []  Ultrasound: []Continuous   [] Pulsed at:                           []1MHz   []3MHz Location:  W/cm2:    [] Paraffin         Location:   []w/heat   10 [x]  Ice     []  Heat  []  Ice massage Position: supine  Location: left knee at end of session    []  Laser  []  Other: Position:  Location:      []  Vasopneumatic Device Pressure:       [] lo [] med [] hi   Temperature:    [x] Skin assessment post-treatment:  [x]intact []redness- no adverse reaction []redness  adverse reaction:     70 min Therapeutic Exercise:  [x] See flow sheet: manual prone quad stretch   Rationale: increase ROM, increase strength, improve coordination and improve balance to improve the patients ability to ambulate and perform functional activities    20 min Neuromuscular Re-education:  [x]  See flow sheet :   SLR flexion performed with [place] stimulation to left quad 5\"/5\" x 5 mins   Rationale: increase strength and improve coordination  to improve the patients ability to ambulate normally and and improve quad control    With   [] TE   [] TA   [] neuro   [] other: Patient Education: [x] Review HEP    [] Progressed/Changed HEP based on:   [] positioning   [] body mechanics   [] transfers   [] heat/ice application    [] other:      Other Objective/Functional Measures: --     Pain Level (0-10 scale) post treatment: 0/10    ASSESSMENT/Changes in Function:    Pt demonstrates improved eccentric quad control. Patient will continue to benefit from skilled PT services to modify and progress therapeutic interventions, address functional mobility deficits, address ROM deficits, address strength deficits, analyze and address soft tissue restrictions, analyze and cue movement patterns, analyze and modify body mechanics/ergonomics, assess and modify postural abnormalities and address imbalance to attain remaining goals    []  See Plan of Care  []  See progress note/recertification  []  See Discharge Summary         Progress towards goals / Updated goals:  Short Term Goals: To be accomplished in 6 weeks:  1) Pt will be Independent with HEP. MET  2) Pt will demonstrate knee extension >/= to 0 degrees ext to aid in ambulation. MET  3) Pt will be able to ambulate with more normalized gait pattern on level terrain. MET  4) Pt will demonstrate knee flexion >/= to 110 to aid in sitting. MET      Long Term Goals:  To be accomplished in 12 weeks:  1) Pt will be able to navigate a flight of stairs without pain.  MET  2) Pt will be able to ambulate on uneven terrain without pain or instability. MET  3) Pt will demonstrate knee flexion >/= 120 degrees to aid in sitting/squatting. progressing  4) Pt will report improvement in overall functional mobility, as measured by FOTO, with an increased score of at least 30 points, from 26 to 56.  progressing    PLAN  [x]  Upgrade activities as tolerated     [x]  Continue plan of care  []  Update interventions per flow sheet       []  Discharge due to:_  []  Other:_      Phong Oneill PT, DPT   10/2/2017  8:15 AM

## 2017-10-04 ENCOUNTER — HOSPITAL ENCOUNTER (OUTPATIENT)
Dept: PHYSICAL THERAPY | Age: 32
Discharge: HOME | End: 2017-10-04
Payer: COMMERCIAL

## 2017-10-04 PROCEDURE — 97110 THERAPEUTIC EXERCISES: CPT

## 2017-10-04 PROCEDURE — 97530 THERAPEUTIC ACTIVITIES: CPT

## 2017-10-04 NOTE — PROGRESS NOTES
PT DAILY TREATMENT NOTE - [facility] 2-15    Patient Name: Jania Drew  Date:10/4/2017  : 1985  [x]  Patient  Verified  Payor: Sreedhar Serrano / Plan: Blake Other PPO / Product Type: PPO /    In time: 8:00am  Out time: 9:15am  Total Treatment Time (min): 75  Total Timed Codes (min): 65  1:1 Treatment Time ( only): --   Visit #: 17    Treatment Area: Left knee pain [M25.562]    SUBJECTIVE  Pain Level (0-10 scale): 0/10  Any medication changes, allergies to medications, adverse drug reactions, diagnosis change, or new procedure performed?: [x] No    [] Yes (see summary sheet for update)  Subjective functional status/changes:   [] No changes reported  Pt reports trying to sprint but feels that left knee is not ready.     OBJECTIVE  Modality rationale: decrease inflammation and decrease pain to improve the patients ability to ambulate normally and perform functional and recreational activities   Min Type Additional Details    [] Estim: []Att   []Unatt        []TENS instruct                  []IFC  []Premod   []NMES                     []Other:  []w/US   []w/ice   []w/heat  Position:  Location:    []  Traction: [] Cervical       []Lumbar                       [] Prone          []Supine                       []Intermittent   []Continuous Lbs:  [] before manual  [] after manual  []w/heat    []  Ultrasound: []Continuous   [] Pulsed at:                           []1MHz   []3MHz Location:  W/cm2:    [] Paraffin         Location:   []w/heat   10 [x]  Ice     []  Heat  []  Ice massage Position: supine  Location: left knee at end of session    []  Laser  []  Other: Position:  Location:      []  Vasopneumatic Device Pressure:       [] lo [] med [] hi   Temperature:    [x] Skin assessment post-treatment:  [x]intact []redness- no adverse reaction    []redness  adverse reaction:     40 min Therapeutic Exercise:  [x] See flow sheet: manual prone quad stretch   Rationale: increase ROM, increase strength, improve coordination and improve balance to improve the patients ability to ambulate and perform functional activities    25 min Therapeutic Activity:  []  See flow sheet : agility drills outside on turf, including: high knees, side shuffling, pivoting and light jogging  Retro-walking on treadmill x 5 minutes at 1. 5mph   Rationale: increase ROM, increase strength, improve coordination, improve balance and increase proprioception  to improve the patients ability to return to recreational activities without limitation     With   [] TE   [] TA   [] neuro   [] other: Patient Education: [x] Review HEP    [] Progressed/Changed HEP based on:   [] positioning   [] body mechanics   [] transfers   [] heat/ice application    [] other:      Other Objective/Functional Measures: --     Pain Level (0-10 scale) post treatment: 0/10    ASSESSMENT/Changes in Function:    Pt denied pain with agility drills, performed outside on turf. Patient will continue to benefit from skilled PT services to modify and progress therapeutic interventions, address functional mobility deficits, address ROM deficits, address strength deficits, analyze and address soft tissue restrictions, analyze and cue movement patterns, analyze and modify body mechanics/ergonomics, assess and modify postural abnormalities and address imbalance to attain remaining goals    []  See Plan of Care  []  See progress note/recertification  []  See Discharge Summary         Progress towards goals / Updated goals:  Short Term Goals: To be accomplished in 6 weeks:  1) Pt will be Independent with HEP. MET  2) Pt will demonstrate knee extension >/= to 0 degrees ext to aid in ambulation. MET  3) Pt will be able to ambulate with more normalized gait pattern on level terrain. MET  4) Pt will demonstrate knee flexion >/= to 110 to aid in sitting. MET      Long Term Goals: To be accomplished in 12 weeks:  1) Pt will be able to navigate a flight of stairs without pain.   MET  2) Pt will be able to ambulate on uneven terrain without pain or instability. MET  3) Pt will demonstrate knee flexion >/= 120 degrees to aid in sitting/squatting. progressing  4) Pt will report improvement in overall functional mobility, as measured by FOTO, with an increased score of at least 30 points, from 26 to 56.  progressing    PLAN  [x]  Upgrade activities as tolerated     [x]  Continue plan of care  []  Update interventions per flow sheet       []  Discharge due to:_  []  Other:_      Dre Felipe PT, DPT   10/4/2017  8:15 AM

## 2017-10-09 ENCOUNTER — HOSPITAL ENCOUNTER (OUTPATIENT)
Dept: PHYSICAL THERAPY | Age: 32
Discharge: HOME | End: 2017-10-09
Payer: COMMERCIAL

## 2017-10-09 PROCEDURE — 97530 THERAPEUTIC ACTIVITIES: CPT

## 2017-10-09 PROCEDURE — 97110 THERAPEUTIC EXERCISES: CPT

## 2017-10-09 NOTE — PROGRESS NOTES
PT DAILY TREATMENT NOTE - [facility] 2-15    Patient Name: Marco Duncan  Date:10/9/2017  : 1985  [x]  Patient  Verified  Payor: Akash Jiang / Plan: Miki Culver PPO / Product Type: PPO /    In time: 7:30am  Out time: 8:40am  Total Treatment Time (min): 70  Total Timed Codes (min): 70  1:1 Treatment Time ( only): --   Visit #: 18    Treatment Area: Left knee pain [M25.562]    SUBJECTIVE  Pain Level (0-10 scale): 0/10  Any medication changes, allergies to medications, adverse drug reactions, diagnosis change, or new procedure performed?: [x] No    [] Yes (see summary sheet for update)  Subjective functional status/changes:   [] No changes reported  Pt denies pain after last visit. Pt did a lot of walking over the weekend; left knee was sore. OBJECTIVE  40 min Therapeutic Exercise:  [x] See flow sheet: manual prone quad stretch   Rationale: increase ROM, increase strength, improve coordination and improve balance to improve the patients ability to ambulate and perform functional activities    30 min Therapeutic Activity:  []  See flow sheet : agility drills, including: high knees, side shuffling, pivoting and light jogging  Retro-walking on treadmill x 5 minutes at 1. 5mph   Rationale: increase ROM, increase strength, improve coordination, improve balance and increase proprioception  to improve the patients ability to return to recreational activities without limitation     With   [] TE   [] TA   [] neuro   [] other: Patient Education: [x] Review HEP    [] Progressed/Changed HEP based on:   [] positioning   [] body mechanics   [] transfers   [] heat/ice application    [] other:      Other Objective/Functional Measures: --     Pain Level (0-10 scale) post treatment: 0/10    ASSESSMENT/Changes in Function:    Pt is progressing well with push-off on left LE. Pt required supervision and verbal cues for proper performance of LE stretching exercises.   Patient will continue to benefit from skilled PT services to modify and progress therapeutic interventions, address functional mobility deficits, address ROM deficits, address strength deficits, analyze and address soft tissue restrictions, analyze and cue movement patterns, analyze and modify body mechanics/ergonomics, assess and modify postural abnormalities and address imbalance to attain remaining goals    []  See Plan of Care  []  See progress note/recertification  []  See Discharge Summary         Progress towards goals / Updated goals:  Short Term Goals: To be accomplished in 6 weeks:  1) Pt will be Independent with HEP. MET  2) Pt will demonstrate knee extension >/= to 0 degrees ext to aid in ambulation. MET  3) Pt will be able to ambulate with more normalized gait pattern on level terrain. MET  4) Pt will demonstrate knee flexion >/= to 110 to aid in sitting. MET      Long Term Goals: To be accomplished in 12 weeks:  1) Pt will be able to navigate a flight of stairs without pain. MET  2) Pt will be able to ambulate on uneven terrain without pain or instability. MET  3) Pt will demonstrate knee flexion >/= 120 degrees to aid in sitting/squatting. progressing  4) Pt will report improvement in overall functional mobility, as measured by FOTO, with an increased score of at least 30 points, from 26 to 56.  progressing    PLAN  [x]  Upgrade activities as tolerated     [x]  Continue plan of care  []  Update interventions per flow sheet       []  Discharge due to:_  []  Other:_      Carlos Santacruz, PT, DPT   10/9/2017  8:15 AM

## 2017-10-12 ENCOUNTER — APPOINTMENT (OUTPATIENT)
Dept: PHYSICAL THERAPY | Age: 32
End: 2017-10-12
Payer: COMMERCIAL

## 2017-10-16 ENCOUNTER — HOSPITAL ENCOUNTER (OUTPATIENT)
Dept: PHYSICAL THERAPY | Age: 32
Discharge: HOME | End: 2017-10-16
Payer: COMMERCIAL

## 2017-10-16 PROCEDURE — 97112 NEUROMUSCULAR REEDUCATION: CPT

## 2017-10-16 PROCEDURE — 97110 THERAPEUTIC EXERCISES: CPT

## 2017-10-16 NOTE — PROGRESS NOTES
PT DAILY TREATMENT NOTE - [facility] 2-15    Patient Name: Josue Brown  Date:10/16/2017  : 1985  [x]  Patient  Verified  Payor: Gaurang Master / Plan: Pawan Chester PPO / Product Type: PPO /    In time: 7:15am  Out time: 8:40  Total Treatment Time (min): 85  Total Timed Codes (min): 75  1:1 Treatment Time (MC only): --   Visit #: 19    Treatment Area: Left knee pain [M25.562]    SUBJECTIVE  Pain Level (0-10 scale): 0/10  Any medication changes, allergies to medications, adverse drug reactions, diagnosis change, or new procedure performed?: [x] No    [] Yes (see summary sheet for update)  Subjective functional status/changes:   [] No changes reported  Pt reports being sick last week and missed PT; did not ride his bike or go to the gym. Pt noticed that with lack of physical activity, left knee bothered him more.     OBJECTIVE  Modality rationale: decrease inflammation and decrease pain to improve the patients ability to perform functional and recreational activities   Min Type Additional Details    [] Estim: []Att   []Unatt        []TENS instruct                  []IFC  []Premod   []NMES                     []Other:  []w/US   []w/ice   []w/heat  Position:  Location:    []  Traction: [] Cervical       []Lumbar                       [] Prone          []Supine                       []Intermittent   []Continuous Lbs:  [] before manual  [] after manual  []w/heat    []  Ultrasound: []Continuous   [] Pulsed at:                           []1MHz   []3MHz Location:  W/cm2:    [] Paraffin         Location:   []w/heat   10 [x]  Ice     []  Heat  []  Ice massage Position: supine  Location: left knee at end of session    []  Laser  []  Other: Position:  Location:      []  Vasopneumatic Device Pressure:       [] lo [] med [] hi   Temperature:    [x] Skin assessment post-treatment:  [x]intact []redness- no adverse reaction    []redness  adverse reaction:     60 min Therapeutic Exercise:  [x] See flow sheet: manual prone quad stretch Rationale: increase ROM, increase strength, improve coordination and improve balance to improve the patients ability to ambulate and perform functional activities     15 min Neuromuscular Re-education:  [x]  See flow sheet :   Rationale: increase strength, improve coordination, improve balance and increase proprioception  to improve the patients ability to perform functional and recreational activities    With   [] TE   [] TA   [] neuro   [] other: Patient Education: [x] Review HEP    [] Progressed/Changed HEP based on:   [] positioning   [] body mechanics   [] transfers   [] heat/ice application    [] other:      Other Objective/Functional Measures: --     Pain Level (0-10 scale) post treatment: 0/10    ASSESSMENT/Changes in Function:    Pt is progressing well with eccentric quad control, without complaint of pain; pt fatigues quickly. Patient will continue to benefit from skilled PT services to modify and progress therapeutic interventions, address functional mobility deficits, address ROM deficits, address strength deficits, analyze and address soft tissue restrictions, analyze and cue movement patterns, analyze and modify body mechanics/ergonomics, assess and modify postural abnormalities and address imbalance to attain remaining goals    []  See Plan of Care  []  See progress note/recertification  []  See Discharge Summary         Progress towards goals / Updated goals:  Short Term Goals: To be accomplished in 6 weeks:  1) Pt will be Independent with HEP. MET  2) Pt will demonstrate knee extension >/= to 0 degrees ext to aid in ambulation. MET  3) Pt will be able to ambulate with more normalized gait pattern on level terrain. MET  4) Pt will demonstrate knee flexion >/= to 110 to aid in sitting. MET      Long Term Goals: To be accomplished in 12 weeks:  1) Pt will be able to navigate a flight of stairs without pain. MET  2) Pt will be able to ambulate on uneven terrain without pain or instability. MET  3) Pt will demonstrate knee flexion >/= 120 degrees to aid in sitting/squatting. progressing  4) Pt will report improvement in overall functional mobility, as measured by FOTO, with an increased score of at least 30 points, from 26 to 56.  progressing    PLAN  [x]  Upgrade activities as tolerated     [x]  Continue plan of care  []  Update interventions per flow sheet       []  Discharge due to:_  []  Other:_      Lola Aparicio PT, DPT   10/16/2017  8:15 AM

## 2017-10-24 ENCOUNTER — HOSPITAL ENCOUNTER (OUTPATIENT)
Dept: PHYSICAL THERAPY | Age: 32
Discharge: HOME | End: 2017-10-24
Payer: COMMERCIAL

## 2017-10-24 PROCEDURE — 97110 THERAPEUTIC EXERCISES: CPT

## 2017-10-24 PROCEDURE — 97112 NEUROMUSCULAR REEDUCATION: CPT

## 2017-10-24 PROCEDURE — 97140 MANUAL THERAPY 1/> REGIONS: CPT

## 2017-10-24 NOTE — PROGRESS NOTES
PT DAILY TREATMENT NOTE - [facility] 2-15    Patient Name: Rosas Potter  Date:10/24/2017  : 1985  [x]  Patient  Verified  Payor: Constanza Ritchie / Plan: Josie Spencer PPO / Product Type: PPO /    In time: 7:30am  Out time: 8:45am  Total Treatment Time (min): 75  Total Timed Codes (min): 65  1:1 Treatment Time (MC only): --   Visit #: 20    Treatment Area: Left knee pain [M25.562]    SUBJECTIVE  Pain Level (0-10 scale): 0/10  Any medication changes, allergies to medications, adverse drug reactions, diagnosis change, or new procedure performed?: [x] No    [] Yes (see summary sheet for update)  Subjective functional status/changes:   [] No changes reported  Pt reports being sick last week and missed PT; did not ride his bike or go to the gym. Pt noticed that with lack of physical activity, left knee bothered him more.     OBJECTIVE  Modality rationale: decrease inflammation and decrease pain to improve the patients ability to perform functional and recreational activities   Min Type Additional Details    [] Estim: []Att   []Unatt        []TENS instruct                  []IFC  []Premod   []NMES                     []Other:  []w/US   []w/ice   []w/heat  Position:  Location:    []  Traction: [] Cervical       []Lumbar                       [] Prone          []Supine                       []Intermittent   []Continuous Lbs:  [] before manual  [] after manual  []w/heat    []  Ultrasound: []Continuous   [] Pulsed at:                           []1MHz   []3MHz Location:  W/cm2:    [] Paraffin         Location:   []w/heat   10 [x]  Ice     []  Heat  []  Ice massage Position: supine  Location: left knee at end of session    []  Laser  []  Other: Position:  Location:      []  Vasopneumatic Device Pressure:       [] lo [] med [] hi   Temperature:    [x] Skin assessment post-treatment:  [x]intact []redness- no adverse reaction    []redness  adverse reaction:     55 min Therapeutic Exercise:  [x] See flow sheet: manual prone quad stretch   Rationale: increase ROM, increase strength, improve coordination and improve balance to improve the patients ability to ambulate and perform functional activities     10 min Neuromuscular Re-education:  [x]  See flow sheet :   Rationale: increase strength, improve coordination, improve balance and increase proprioception  to improve the patients ability to perform functional and recreational activities    With   [] TE   [] TA   [] neuro   [] other: Patient Education: [x] Review HEP    [] Progressed/Changed HEP based on:   [] positioning   [] body mechanics   [] transfers   [] heat/ice application    [] other:      Other Objective/Functional Measures: --     Pain Level (0-10 scale) post treatment: 0/10    ASSESSMENT/Changes in Function:    []  See Plan of Care  []  See progress note/recertification  []  See Discharge Summary         Progress towards goals / Updated goals:  Short Term Goals: To be accomplished in 6 weeks:  1) Pt will be Independent with HEP. MET  2) Pt will demonstrate knee extension >/= to 0 degrees ext to aid in ambulation. MET  3) Pt will be able to ambulate with more normalized gait pattern on level terrain. MET  4) Pt will demonstrate knee flexion >/= to 110 to aid in sitting. MET      Long Term Goals: To be accomplished in 12 weeks:  1) Pt will be able to navigate a flight of stairs without pain. MET  2) Pt will be able to ambulate on uneven terrain without pain or instability. MET  3) Pt will demonstrate knee flexion >/= 120 degrees to aid in sitting/squatting. MET  4) Pt will report improvement in overall functional mobility, as measured by FOTO, with an increased score of at least 30 points, from 26 to 56.  NOT ASSESSED    PLAN  []  Upgrade activities as tolerated     []  Continue plan of care  []  Update interventions per flow sheet       [x]  Discharge due to:_HEP  []  Other:_      Magy Escobar, PT, DPT   10/24/2017  8:15 AM

## 2017-11-15 NOTE — ANCILLARY DISCHARGE INSTRUCTIONS
[facility]  [address] [address]  Phone: [phone]  Fax: [phone]    Discharge Summary  2-15    Patient name: Yue Li  : 1985  Provider#: [id number]  Referral source: Letty Bradshaw MD      Medical/Treatment Diagnosis: Left knee pain [M25.562]     Prior Hospitalization: see medical history     Comorbidities: none noted  Prior Level of Function:Pt works full-time as RN for the [employer]; completes 20 minutes of exercise 1-2x/week  Medications: Verified on Patient Summary List    Start of Care: 2017     Onset Date:2017   Visits from Start of Care: 20     Missed Visits: 0  Reporting Period : 2017 to 10/24/2017    Progress towards goals / Updated goals:  Short Term Goals: To be accomplished in 6 weeks:  1) Pt will be Independent with HEP. MET  2) Pt will demonstrate knee extension >/= to 0 degrees ext to aid in ambulation. MET  3) Pt will be able to ambulate with more normalized gait pattern on level terrain.  MET  4) Pt will demonstrate knee flexion >/= to 110 to aid in sitting. MET      Long Term Goals: To be accomplished in 12 weeks:  1) Pt will be able to navigate a flight of stairs without pain.  MET  2) Pt will be able to ambulate on uneven terrain without pain or instability. MET  3) Pt will demonstrate knee flexion >/= 120 degrees to aid in sitting/squatting. MET  4) Pt will report improvement in overall functional mobility, as measured by FOTO, with an increased score of at least 30 points, from 26 to 56. NOT ASSESSED    ASSESSMENT/SUMMARY OF CARE: Pt participated in 21 outpatient PT sessions, 2017-10/24/2017, s/p left tibial tubercle osteotomy and arthroscopic microfracture to trochlea, 2017.   Treatment included therapeutic exercise, neuromuscular re-education, therapeutic activity, pt education, [employer], home exercise program.  Pt progressed well with PT and met all established PT goals. Pt has returned to riding his bike and working out at the gym. Pt is discharged at this time.     RECOMMENDATIONS:  [x]Discontinue therapy: [x]Patient has reached or is progressing toward set goals      []Patient is non-compliant or has abdicated      []Due to lack of appreciable progress towards set goals    Carlos Santacruz, PT, DPT   11/15/2017 2:25 PM

## 2017-12-01 ENCOUNTER — HOSPITAL ENCOUNTER (EMERGENCY)
Age: 32
Discharge: HOME | End: 2017-12-01
Attending: EMERGENCY MEDICINE

## 2017-12-01 VITALS
DIASTOLIC BLOOD PRESSURE: 81 MMHG | WEIGHT: 217 LBS | TEMPERATURE: 97.5 F | OXYGEN SATURATION: 98 % | HEIGHT: 70 IN | SYSTOLIC BLOOD PRESSURE: 132 MMHG | HEART RATE: 86 BPM | RESPIRATION RATE: 18 BRPM | BODY MASS INDEX: 31.07 KG/M2

## 2017-12-01 DIAGNOSIS — R42 VERTIGO: Primary | ICD-10-CM

## 2017-12-01 RX ORDER — TRAMADOL HYDROCHLORIDE 50 MG/1
50 TABLET ORAL
COMMUNITY
End: 2020-01-17 | Stop reason: ALTCHOICE

## 2017-12-01 RX ORDER — MECLIZINE HYDROCHLORIDE 25 MG/1
25 TABLET ORAL DAILY
Status: DISCONTINUED | OUTPATIENT
Start: 2017-12-02 | End: 2017-12-01 | Stop reason: HOSPADM

## 2017-12-01 RX ORDER — MECLIZINE HYDROCHLORIDE 25 MG/1
25 TABLET ORAL
Qty: 30 TAB | Refills: 0 | Status: SHIPPED | OUTPATIENT
Start: 2017-12-01 | End: 2017-12-11

## 2017-12-01 RX ADMIN — MECLIZINE HYDROCHLORIDE 25 MG: 25 TABLET ORAL at 15:15

## 2017-12-01 NOTE — DISCHARGE INSTRUCTIONS
Dizziness: Care Instructions  Your Care Instructions  Dizziness is the feeling of unsteadiness or fuzziness in your head. It is different than having vertigo, which is a feeling that the room is spinning or that you are moving or falling. It is also different from lightheadedness, which is the feeling that you are about to faint. It can be hard to know what causes dizziness. Some people feel dizzy when they have migraine headaches. Sometimes bouts of flu can make you feel dizzy. Some medical conditions, such as heart problems or high blood pressure, can make you feel dizzy. Many medicines can cause dizziness, including medicines for high blood pressure, pain, or anxiety. If a medicine causes your symptoms, your doctor may recommend that you stop or change the medicine. If it is a problem with your heart, you may need medicine to help your heart work better. If there is no clear reason for your symptoms, your doctor may suggest watching and waiting for a while to see if the dizziness goes away on its own. Follow-up care is a key part of your treatment and safety. Be sure to make and go to all appointments, and call your doctor if you are having problems. It's also a good idea to know your test results and keep a list of the medicines you take. How can you care for yourself at home? · If your doctor recommends or prescribes medicine, take it exactly as directed. Call your doctor if you think you are having a problem with your medicine. · Do not drive while you feel dizzy. · Try to prevent falls. Steps you can take include:  ¨ Using nonskid mats, adding grab bars near the tub, and using night-lights. ¨ Clearing your home so that walkways are free of anything you might trip on. ¨ Letting family and friends know that you have been feeling dizzy. This will help them know how to help you. When should you call for help? Call 911 anytime you think you may need emergency care.  For example, call if:  ? · You passed out (lost consciousness). ? · You have dizziness along with symptoms of a heart attack. These may include:  ¨ Chest pain or pressure, or a strange feeling in the chest.  ¨ Sweating. ¨ Shortness of breath. ¨ Nausea or vomiting. ¨ Pain, pressure, or a strange feeling in the back, neck, jaw, or upper belly or in one or both shoulders or arms. ¨ Lightheadedness or sudden weakness. ¨ A fast or irregular heartbeat. ? · You have symptoms of a stroke. These may include:  ¨ Sudden numbness, tingling, weakness, or loss of movement in your face, arm, or leg, especially on only one side of your body. ¨ Sudden vision changes. ¨ Sudden trouble speaking. ¨ Sudden confusion or trouble understanding simple statements. ¨ Sudden problems with walking or balance. ¨ A sudden, severe headache that is different from past headaches. ?Call your doctor now or seek immediate medical care if:  ? · You feel dizzy and have a fever, headache, or ringing in your ears. ? · You have new or increased nausea and vomiting. ? · Your dizziness does not go away or comes back. ? Watch closely for changes in your health, and be sure to contact your doctor if:  ? · You do not get better as expected. Where can you learn more? Go to http://remy-roz.info/. Enter I729 in the search box to learn more about \"Dizziness: Care Instructions. \"  Current as of: March 20, 2017  Content Version: 11.4  © 9257-4345 [employer]. Care instructions adapted under license by [employer] (which disclaims liability or warranty for this information). If you have questions about a medical condition or this instruction, always ask your healthcare professional. [place] [address] any warranty or liability for your use of this information. Epley Maneuver at Home for Vertigo: Exercises  Your Care Instructions  Vertigo is a spinning or whirling sensation when you move your head.   Your doctor may have moved you in different positions to help your vertigo get better faster. This is called the Epley maneuver. Your doctor also may have asked you to do these exercises at home. Do the exercises as often as your doctor recommends. If your vertigo is getting worse, your doctor may have you change the exercise or stop it. How to do the exercises  Step 1    1. Sit on the edge of a bed or sofa. Step 2    1. Turn your head 45 degrees in the direction your doctor told you to. This may be toward the ear that causes the most vertigo for you. Step 3    1. Tilt yourself backward until you are lying on your back. Your head should still be at a 45-degree turn. Your head should be about midway between looking straight ahead and looking out to your side. Hold for 30 seconds. If you have vertigo, stay in this position until it stops. Step 4    1. Turn your head 90 degrees toward the ear that has the least vertigo. The point of your chin should be over your shoulder. Hold for 30 seconds. Step 5    1. Roll onto the side of the ear with the least vertigo. You should now be looking at the floor. Follow-up care is a key part of your treatment and safety. Be sure to make and go to all appointments, and call your doctor if you are having problems. It's also a good idea to know your test results and keep a list of the medicines you take. Where can you learn more? Go to http://remy-roz.info/. Enter 470 [id number] in the search box to learn more about \"Epley Maneuver at Home for Vertigo: Exercises. \"  Current as of: October 14, 2016  Content Version: 11.4  © 3266-3617 [employer]. Care instructions adapted under license by [employer] (which disclaims liability or warranty for this information).  If you have questions about a medical condition or this instruction, always ask your healthcare professional. [place] [address] any warranty or liability for your use of this information. Vertigo: Care Instructions  Your Care Instructions    Vertigo is the feeling that you or your surroundings are moving when there is no actual movement. It is often described as a feeling of spinning, whirling, falling, or tilting. Vertigo may make you vomit or feel nauseated. You may have trouble standing or walking and may lose your balance. Vertigo is often related to an inner ear problem, but it can have other more serious causes. If vertigo continues, you may need more tests to find its cause. Follow-up care is a key part of your treatment and safety. Be sure to make and go to all appointments, and call your doctor if you are having problems. It's also a good idea to know your test results and keep a list of the medicines you take. How can you care for yourself at home? · Do not lie flat on your back. Prop yourself up slightly. This may reduce the spinning feeling. Keep your eyes open. · Move slowly so that you do not fall. · If your doctor recommends medicine, take it exactly as directed. · Do not drive while you are having vertigo. Certain exercises, called Broussard-Daroff exercises, can help decrease vertigo. To do Broussard-Daroff exercises:  · Sit on the edge of a bed or sofa and quickly lie down on the side that causes the worst vertigo. Lie on your side with your ear down. · Stay in this position for at least 30 seconds or until the vertigo goes away. · Sit up. If this causes vertigo, wait for it to stop. · Repeat the procedure on the other side. · Repeat this 10 times. Do these exercises 2 times a day until the vertigo is gone. When should you call for help? Call 911 anytime you think you may need emergency care. For example, call if:  ? · You passed out (lost consciousness). ? · You have symptoms of a stroke. These may include:  ¨ Sudden numbness, tingling, weakness, or loss of movement in your face, arm, or leg, especially on only one side of your body.   ¨ Sudden vision changes. ¨ Sudden trouble speaking. ¨ Sudden confusion or trouble understanding simple statements. ¨ Sudden problems with walking or balance. ¨ A sudden, severe headache that is different from past headaches. ?Call your doctor now or seek immediate medical care if:  ? · Vertigo occurs with a fever, a headache, or ringing in your ears. ? · You have new or increased nausea and vomiting. ? Watch closely for changes in your health, and be sure to contact your doctor if:  ? · Vertigo gets worse or happens more often. ? · Vertigo has not gotten better after 2 weeks. Where can you learn more? Go to http://remy-roz.info/. Enter S079 in the search box to learn more about \"Vertigo: Care Instructions. \"  Current as of: May 12, 2017  Content Version: 11.4  © 2329-3321 [employer]. Care instructions adapted under license by [email or web address] (which disclaims liability or warranty for this information). If you have questions about a medical condition or this instruction, always ask your healthcare professional. [place] [address] any warranty or liability for your use of this information.

## 2017-12-01 NOTE — UC PROVIDER NOTE
Patient is a 29 y.o. male presenting with dizziness. The history is provided by the patient. Dizziness   This is a new problem. The current episode started yesterday. The problem has not changed (episiodes of dizziness related to movement and better at rest.  He had on episode yesterday after getting up from bed and then 2 more episodes today. Both got better with rest and seemed to be wose with certain head moevements. ) since onset. There was no focality noted. Primary symptoms include loss of balance. Pertinent negatives include no focal weakness, no loss of sensation, no slurred speech, no speech difficulty, no memory loss, no agitation, no visual change, no mental status change and no unresponsiveness. There has been no fever. Pertinent negatives include no shortness of breath, no chest pain, no vomiting, no altered mental status, no confusion, no headaches and no nausea. There were no medications administered prior to arrival. Associated medical issues do not include trauma. Past Medical History:   Diagnosis Date    Asthma     Ill-defined condition     L knee        History reviewed. No pertinent surgical history. History reviewed. No pertinent family history. Social History     Social History    Marital status:      Spouse name: N/A    Number of children: 2    Years of education: N/A     Occupational History   [email or web address]     Social History Main Topics    Smoking status: Never Smoker    Smokeless tobacco: Never Used    Alcohol use Yes      Comment: socially    Drug use: No    Sexual activity: Not on file     Other Topics Concern    Not on file     Social History Narrative    Religion kai, Attends Speaking [employer] [address]: Review of patient's allergies indicates no known allergies. Review of Systems   Constitutional: Negative. Negative for fever.    HENT: Negative for congestion, ear discharge, ear pain, rhinorrhea, sinus pain, sinus pressure and sore throat. Eyes: Negative. Negative for visual disturbance. Respiratory: Negative for shortness of breath. Cardiovascular: Negative. Negative for chest pain. Gastrointestinal: Negative. Negative for nausea and vomiting. Musculoskeletal: Negative for gait problem, neck pain and neck stiffness. Neurological: Positive for dizziness, light-headedness (feels a little lightheaded with dizziness) and loss of balance. Negative for focal weakness, seizures, syncope, speech difficulty, weakness, numbness and headaches. Hematological: Negative. Psychiatric/Behavioral: Negative for agitation, confusion and memory loss. Vitals:    12/01/17 1434   BP: 132/81   Pulse: 86   Resp: 18   Temp: 97.5 °F (36.4 °C)   SpO2: 98%   Weight: 98.4 kg (217 lb)   Height: 5' 10\" (1.778 m)       Physical Exam   Constitutional: He is oriented to person, place, and time. He appears well-developed and well-nourished. HENT:   Head: Normocephalic and atraumatic. Right Ear: External ear normal.   Left Ear: External ear normal.   Mouth/Throat: Oropharynx is clear and moist. No oropharyngeal exudate. Eyes: Conjunctivae are normal. Pupils are equal, round, and reactive to light. Right eye exhibits no discharge. Left eye exhibits no discharge. No scleral icterus. Lateral beat nystagmus with head movement and symptoms. Resolves with rest   Neck: Normal range of motion. Neck supple. No tracheal deviation present. No thyromegaly present. No bruits     Cardiovascular: Normal rate, regular rhythm, normal heart sounds and intact distal pulses. No murmur heard. Pulmonary/Chest: Effort normal and breath sounds normal. No respiratory distress. He has no wheezes. He has no rales. Musculoskeletal: Normal range of motion. He exhibits no edema or tenderness. Lymphadenopathy:     He has no cervical adenopathy. Neurological: He is alert and oriented to person, place, and time.  He has normal reflexes. He displays normal reflexes. No cranial nerve deficit. He exhibits normal muscle tone. Coordination normal.   Ambulatory with a steady gait. Skin: Skin is warm. No rash noted. No erythema. Psychiatric: He has a normal mood and affect. His behavior is normal. Judgment and thought content normal.   Nursing note and vitals reviewed. MDM     Differential Diagnosis; Clinical Impression; Plan:     CLINICAL IMPRESSION:  Vertigo  (primary encounter diagnosis)    Plan:  1. Meclizine  2. Supportive care  3. Close follow up for any new or persistent symptoms    Risk of Significant Complications, Morbidity, and/or Mortality:   Presenting problems: Moderate  Management options:   Moderate  Progress:   Patient progress:  Stable      Procedures

## 2017-12-01 NOTE — UC PROVIDER NOTE
Patient is a 29 y.o. male presenting with dizziness. The history is provided by the patient. Dizziness   This is a new problem. The current episode started yesterday. The problem has not changed (episiodes of dizziness related to movement and better at rest.  He had on episode yesterday after getting up from bed and then 2 more episodes today. Both got better with rest and seemed to be wose with certain head moevements. ) since onset. There was no focality noted. Primary symptoms include loss of balance. Pertinent negatives include no focal weakness, no loss of sensation, no slurred speech, no speech difficulty, no memory loss, no agitation, no visual change, no mental status change and no unresponsiveness. There has been no fever. Pertinent negatives include no shortness of breath, no chest pain, no vomiting, no altered mental status, no confusion, no headaches and no nausea. There were no medications administered prior to arrival. Associated medical issues do not include trauma. Past Medical History:   Diagnosis Date    Asthma     Ill-defined condition     L knee        History reviewed. No pertinent surgical history. History reviewed. No pertinent family history. Social History     Social History    Marital status:      Spouse name: N/A    Number of children: 2    Years of education: N/A     Occupational History   [address]     Social History Main Topics    Smoking status: Never Smoker    Smokeless tobacco: Never Used    Alcohol use Yes      Comment: socially    Drug use: No    Sexual activity: Not on file     Other Topics Concern    Not on file     Social History Narrative    Yazidi kai, Attends [employer]: Review of patient's allergies indicates no known allergies. Review of Systems   Constitutional: Negative. Negative for fever.    HENT: Negative for congestion, ear discharge, ear pain, rhinorrhea, sinus pain, sinus pressure and sore throat. Eyes: Negative. Negative for visual disturbance. Respiratory: Negative for shortness of breath. Cardiovascular: Negative. Negative for chest pain. Gastrointestinal: Negative. Negative for nausea and vomiting. Musculoskeletal: Negative for gait problem, neck pain and neck stiffness. Neurological: Positive for dizziness, light-headedness (feels a little lightheaded with dizziness) and loss of balance. Negative for focal weakness, seizures, syncope, speech difficulty, weakness, numbness and headaches. Hematological: Negative. Psychiatric/Behavioral: Negative for agitation, confusion and memory loss. Vitals:    12/01/17 1434   BP: 132/81   Pulse: 86   Resp: 18   Temp: 97.5 °F (36.4 °C)   SpO2: 98%   Weight: 98.4 kg (217 lb)   Height: 5' 10\" (1.778 m)       Physical Exam   Constitutional: He is oriented to person, place, and time. He appears well-developed and well-nourished. HENT:   Head: Normocephalic and atraumatic. Right Ear: External ear normal.   Left Ear: External ear normal.   Mouth/Throat: Oropharynx is clear and moist. No oropharyngeal exudate. Eyes: Conjunctivae are normal. Pupils are equal, round, and reactive to light. Right eye exhibits no discharge. Left eye exhibits no discharge. No scleral icterus. Lateral beat nystagmus with head movement and symptoms. Resolves with rest   Neck: Normal range of motion. Neck supple. No tracheal deviation present. No thyromegaly present. No bruits     Cardiovascular: Normal rate, regular rhythm, normal heart sounds and intact distal pulses. No murmur heard. Pulmonary/Chest: Effort normal and breath sounds normal. No respiratory distress. He has no wheezes. He has no rales. Musculoskeletal: Normal range of motion. He exhibits no edema or tenderness. Lymphadenopathy:     He has no cervical adenopathy. Neurological: He is alert and oriented to person, place, and time.  He has normal reflexes. He displays normal reflexes. No cranial nerve deficit. He exhibits normal muscle tone. Coordination normal.   Ambulatory with a steady gait. Skin: Skin is warm. No rash noted. No erythema. Psychiatric: He has a normal mood and affect. His behavior is normal. Judgment and thought content normal.   Nursing note and vitals reviewed. MDM     Differential Diagnosis; Clinical Impression; Plan:     CLINICAL IMPRESSION:  Vertigo  (primary encounter diagnosis)    Plan:  1. Meclizine  2. Supportive care  3. Close follow up for any new or persistent symptoms    Risk of Significant Complications, Morbidity, and/or Mortality:   Presenting problems: Moderate  Management options:   Moderate  Progress:   Patient progress:  Stable      Procedures

## 2017-12-18 RX ORDER — CEPHALEXIN 250 MG/1
CAPSULE ORAL
Qty: 1 INHALER | Refills: 3 | Status: SHIPPED | OUTPATIENT
Start: 2017-12-18 | End: 2018-12-23 | Stop reason: SDUPTHER

## 2018-07-19 ENCOUNTER — OFFICE VISIT (OUTPATIENT)
Dept: INTERNAL MEDICINE CLINIC | Age: 33
End: 2018-07-19

## 2018-07-19 VITALS
HEART RATE: 79 BPM | BODY MASS INDEX: 32 KG/M2 | DIASTOLIC BLOOD PRESSURE: 77 MMHG | RESPIRATION RATE: 16 BRPM | OXYGEN SATURATION: 96 % | TEMPERATURE: 97.3 F | WEIGHT: 223.5 LBS | SYSTOLIC BLOOD PRESSURE: 114 MMHG | HEIGHT: 70 IN

## 2018-07-19 DIAGNOSIS — J45.901 MODERATE ASTHMA WITH EXACERBATION, UNSPECIFIED WHETHER PERSISTENT: Primary | ICD-10-CM

## 2018-07-19 DIAGNOSIS — M67.431 GANGLION, RIGHT WRIST: ICD-10-CM

## 2018-07-19 RX ORDER — TRIAMCINOLONE ACETONIDE 40 MG/ML
40 INJECTION, SUSPENSION INTRA-ARTICULAR; INTRAMUSCULAR ONCE
Qty: 1 ML | Refills: 0
Start: 2018-07-19 | End: 2018-07-19

## 2018-07-19 RX ORDER — METHYLPREDNISOLONE 4 MG/1
TABLET ORAL
Qty: 1 DOSE PACK | Refills: 0 | Status: SHIPPED | OUTPATIENT
Start: 2018-07-19 | End: 2020-01-17 | Stop reason: ALTCHOICE

## 2018-07-19 NOTE — MR AVS SNAPSHOT
[address] [facility] 
 
 
 [id number] 
[phone] Patient: Rosas Potter MRN: [id number] :1985 Visit Information Date & Time Provider Department Dept. Phone Encounter #  
 2018  4:30 PM Lauren Lagunas MD [facility] [phone] [id number] Follow-up Instructions Return if symptoms worsen or fail to improve, for Diabetes Check. Your Appointments 2018  4:30 PM  
Any with Lauren Lagunas MD  
[address] [place] [address] and Primary Care [facility]) Appt Note: [facility]  
 [address] [facility]  
  
   
 [address] [id number] Upcoming Health Maintenance Date Due DTaP/Tdap/Td series (1 - Tdap) 2019* Influenza Age 5 to Adult 2018 *Topic was postponed. The date shown is not the original due date. Allergies as of 2018  Review Complete On: 2018 By: Lauren Lagunas MD  
 No Known Allergies Current Immunizations  Never Reviewed No immunizations on file. Not reviewed this visit You Were Diagnosed With   
  
 Codes Comments Moderate asthma with exacerbation, unspecified whether persistent    -  Primary ICD-10-CM: J45.901 ICD-9-CM: 363.10 Ganglion, right wrist     ICD-10-CM: K83.195 ICD-9-CM: 727.41 Vitals BP Pulse Temp Resp Height(growth percentile) Weight(growth percentile) 114/77 (BP 1 Location: Right arm, BP Patient Position: Sitting) 79 97.3 °F (36.3 °C) (Oral) 16 5' 10\" (1.778 m) 223 lb 8 oz (101.4 kg) SpO2 BMI Smoking Status 96% 32.07 kg/m2 Never Smoker Vitals History BMI and BSA Data Body Mass Index Body Surface Area 32.07 kg/m 2 2.24 m 2 Preferred Pharmacy Pharmacy Name Phone [facility] [address] [facility]  [place] [phone] Your Updated Medication List  
  
   
This list is accurate as of 7/19/18  4:21 PM.  Always use your most recent med list.  
  
  
  
  
 ADVAIR DISKUS 100-50 mcg/dose diskus inhaler Generic drug:  fluticasone-salmeterol INHALE 1 PUFF BY MOUTH EVERY 12 HOURS  
  
 albuterol 90 mcg/actuation inhaler Commonly known as:  VENTOLIN HFA Take 2 Puffs by inhalation every four (4) hours as needed for Wheezing. methylPREDNISolone 4 mg tablet Commonly known as:  Elverna Jenna Take as directed  
  
 triamcinolone acetonide 40 mg/mL injection Commonly known as:  KENALOG  
1 mL by Intra artICUlar route once for 1 dose. ULTRAM 50 mg tablet Generic drug:  traMADol Take 50 mg by mouth every six (6) hours as needed for Pain. Prescriptions Sent to Pharmacy Refills  
 methylPREDNISolone (MEDROL DOSEPACK) 4 mg tablet 0 Sig: Take as directed Class: Normal  
 Pharmacy: [facility] [address] #: [phone] We Performed the Following FL ARTHROCNT ASPIR&/INJ SMALL JT/BURSAW/US REC RPRT [id number] CPT(R)] TRIAMCINOLONE ACETONIDE INJ [ [facility]] Follow-up Instructions Return if symptoms worsen or fail to improve, for Diabetes Check. Introducing [facility]! Ludmila Springer introduces [email or web address] patient portal. Now you can access parts of your medical record, email your doctor's office, and request medication refills online. 1. In your internet browser, go to [email or web address] 2. Click on the First Time User? Click Here link in the Sign In box. You will see the New Member Sign Up page. 3. Enter your [email or web address] Access Code exactly as it appears below. You will not need to use this code after youve completed the sign-up process. If you do not sign up before the expiration date, you must request a new code.  
 
· [email or web address] Access Code: [email or web address] 
 Expires: 10/17/2018  4:21 PM 
 
4. Enter the last four digits of your Social Security Number (xxxx) and Date of Birth (mm/dd/yyyy) as indicated and click Submit. You will be taken to the next sign-up page. 5. Create a [employer] ID. This will be your [employer] login ID and cannot be changed, so think of one that is secure and easy to remember. 6. Create a [employer] password. You can change your password at any time. 7. Enter your Password Reset Question and Answer. This can be used at a later time if you forget your password. 8. Enter your e-mail address. You will receive e-mail notification when new information is available in [address] 9. Click Sign Up. You can now view and download portions of your medical record. 10. Click the Download Summary menu link to download a portable copy of your medical information. If you have questions, please visit the Frequently Asked Questions section of the [employer] website. Remember, [employer] is NOT to be used for urgent needs. For medical emergencies, dial 911. Now available from your iPhone and Android! Please provide this summary of care documentation to your next provider. Your primary care clinician is listed as Charlie Gongora. If you have any questions after today's visit, please call [phone].

## 2018-07-19 NOTE — PROGRESS NOTES
Chief Complaint   Patient presents with    Cough     he is a 34y.o. year old male who presents for evaluation of ongoing cough  congestion and productive cough for 4 days. NOlow grade fevers. no nausea and no vomiting . No sore throat, swollen glands, night sweats, myalgias, headache and fever. Over-the-counter remedies including nebulizer treatments   with momentary relief. Hx Asthma:  yes  Smoker:  no  Contacts with similar infections: no   Recent travel:no   Sputum Description: clear and white      Reviewed and agree with Nurse Note and duplicated in this note. Reviewed PmHx, RxHx, FmHx, SocHx, AllgHx and updated and dated in the chart. History reviewed. No pertinent family history.     Past Medical History:   Diagnosis Date    Asthma     Ill-defined condition     L knee      Social History     Social History    Marital status:      Spouse name: N/A    Number of children: 2    Years of education: N/A     Occupational History   [address] [employer]     Social History Main Topics    Smoking status: Never Smoker    Smokeless tobacco: Never Used    Alcohol use Yes      Comment: socially    Drug use: No    Sexual activity: Not Asked     Other Topics Concern    None     Social History Narrative    [employer], Attends Speaking [employer]        Review of Systems - negative except as listed above      Objective:     Vitals:    07/19/18 1546   BP: 114/77   Pulse: 79   Resp: 16   Temp: 97.3 °F (36.3 °C)   TempSrc: Oral   SpO2: 96%   Weight: 223 lb 8 oz (101.4 kg)   Height: 5' 10\" (1.778 m)       Physical Examination: General appearance - alert, well appearing, and in no distress  Eyes - pupils equal and reactive, extraocular eye movements intact  Ears - bilateral TM's and external ear canals normal  Nose - normal and patent, no erythema, discharge or polyps  Mouth - mucous membranes moist, pharynx normal without lesions  Neck - supple, no significant adenopathy  Chest - clear to auscultation, no wheezes, rales or rhonchi, symmetric air entry  Heart - normal rate, regular rhythm, normal S1, S2, no murmurs, rubs, clicks or gallops  Abdomen - soft, nontender, nondistended, no masses or organomegaly  Back exam - full range of motion, no tenderness, palpable spasm or pain on motion  Neurological - alert, oriented, normal speech, no focal findings or movement disorder noted  Musculoskeletal - right wrist -large dorsal ganglion cyst freely mobile  Extremities - peripheral pulses normal, no pedal edema, no clubbing or cyanosis  Skin - normal coloration and turgor, no rashes, no suspicious skin lesions noted  Time Out taken at:  4:04 PM  7/19/2018    * Patient was identified by name and date of birth   * Agreement on procedure being performed was verified  * Risks and Benefits explained to the patient  * Procedure site verified and marked as necessary  * Patient was positioned for comfort  * Consent was signed and verified   In the presence of: Witness: DARNELL Boyle  Injection #: 1  Needle:  16 gauge  Procedure: This procedure was discussed with Zain Girard and other therapeutic options were considered (risks vs benefits). Zain Girard and I thought that an injection was merited. After informed consent was obtained, landmarks were identified(marked), and the right ganglion  was cleansed with ChlorPrep in the standard sterile manner. .5 mL  1% lidocaine  and  .5 mL Kenalog  was then injected and needle tenotomy was not performed. Procedure performed with ultrasound needle guidance. The needle was then withdrawn. T he procedure was well tolerated. The patient is asked to continue to rest the area for a few more days before resuming regular activities. It may be more painful for the first 1-2 days. NSAIDS are to be avoided. Watch for fever, or increased swelling or persistent pain in the joint.  Call or return to clinic prn if such symptoms occur or there is failure to improve as anticipated. The procedure did provide relief of symptoms in the clinic. RTC in 4 weeks for reevaluation and possible reinjection. Given the patient's body habitus and the anatomically deep nature of this structure, sonographic guidance is recommended to prevent injury to neurovascular structures and confirm accuracy of injection. Furthermore, this patient has failed conservative treatment with physical therapy and modalities and the diagnostic and therapeutic accuracy is important. Assessment/ Plan:   Diagnoses and all orders for this visit:    1. Moderate asthma with exacerbation, unspecified whether persistent    2. Ganglion, right wrist  -     TRIAMCINOLONE ACETONIDE INJ  -     triamcinolone acetonide (KENALOG) 40 mg/mL injection; 1 mL by Intra artICUlar route once for 1 dose. -     20604 - DRAIN/INJECT  SMALL JOINT/BURSA WITH US    Other orders  -     methylPREDNISolone (MEDROL DOSEPACK) 4 mg tablet; Take as directed     Follow-up Disposition: Not on File  Adults:  For nasal congestion, cough and cold/flu symptoms I advised:    - Seek medical care if symptoms become more severe or if you develop      chest pain, shortness of breath, confusion.    - Contact us if your symptoms fail to improve after 7-10 days   - Rest as much as possible and stay home from work/school at least 24 hours                  after last fever              - Wash hand frequently and cough/sneeze into your sleeve to help prevent       infection of others   - Drink plenty of fluids   - Ibuprofen (Advil, Motrin) 400-800mg every 6 hours or                  Aleve 220 mg 1-2 pills every 8 hours for fever, headache, pain   - Tylenol extra strength 500 mg every 6 hours for pain, headache, fever   - Nasal saline rinses 2-3 times daily for nasal congestion   - Mucinex 1200 mg twice daily or Guaifenesin 400 mg every 4 hours for chest       congestion              - Robitussin DM or Delsym for cough(suppress cough and thin mucus. )   - Cepacol throat lozenges and saline gargles (1 tsp salt in 8 oz water) for sore       throat   - Tea with honey for cough (buckwheat honey preferred)              - Benadryl (diphenhydramine) 50 mg at night for nasal congestion/allergies   - Pseudoephedrine 12-hour tablets twice daily for nasal and inner ear       -Ask your pharmacist (this is kept behind the counter)     -If you have high blood pressure or heart disease, use this        medication with caution (ask your doctor), alternative coricidin   - Afrin (oxymetazoline) nasal spray 2 sprays in each nostril twice daily for severe      congestion.       -Do not use this medication for more than 3 days as it may cause         \"rebound congestion\". -If you have high blood pressure or heart disease, use this medication       with caution (ask your doctor)      Children:   Sit in bathroom with hot shower running for steam.    Nasal saline rinses and Neti pot  In general for viral respiratory infection -  Aim for drainage/increased airflow  Increase fluids - Pedialyte popsicles, Gatorade mixed with 50% water     BMI with him. I have recommended the following interventions: dietary management education, guidance, and counseling . The BMI follow up plan is as follows: I have counseled this patient on diet and exercise regimens    1) Remember to stay active and/or exercise regularly (I suggest 30-45 minutes daily)   2) For reliable dietary information, go to www. EATRIGHT.org. You may wish to consider seeing the nutritionist at [facility] [phone], also consider the [address] I have discussed the diagnosis with the patient and the intended plan as seen in the above orders. The patient has received an after-visit summary and questions were answered concerning future plans.      Medication Side Effects and Warnings were discussed with patient: yes  Patient Labs were reviewed and or requested: yes  Patient Past Records were reviewed and or requested  yes  I have discussed the diagnosis with the patient and the intended plan as seen in the above orders. The patient has received an after-visit summary and questions were answered concerning future plans. Pt agrees to call or return to clinic and/or go to closest ER with any worsening of symptoms. This may include, but not limited to increased fever (>100.4) with NSAIDS or Tylenol, increased edema, confusion, rash, worsening of presenting symptoms.

## 2018-09-26 ENCOUNTER — APPOINTMENT (OUTPATIENT)
Dept: GENERAL RADIOLOGY | Age: 33
End: 2018-09-26
Attending: EMERGENCY MEDICINE
Payer: OTHER GOVERNMENT

## 2018-09-26 ENCOUNTER — APPOINTMENT (OUTPATIENT)
Dept: GENERAL RADIOLOGY | Age: 33
End: 2018-09-26
Attending: PHYSICIAN ASSISTANT
Payer: OTHER GOVERNMENT

## 2018-09-26 ENCOUNTER — HOSPITAL ENCOUNTER (EMERGENCY)
Age: 33
Discharge: HOME | End: 2018-09-26
Attending: EMERGENCY MEDICINE
Payer: OTHER GOVERNMENT

## 2018-09-26 VITALS
RESPIRATION RATE: 18 BRPM | SYSTOLIC BLOOD PRESSURE: 136 MMHG | BODY MASS INDEX: 32.26 KG/M2 | OXYGEN SATURATION: 99 % | HEIGHT: 70 IN | WEIGHT: 225.31 LBS | TEMPERATURE: 97.8 F | HEART RATE: 70 BPM | DIASTOLIC BLOOD PRESSURE: 90 MMHG

## 2018-09-26 DIAGNOSIS — K59.00 CONSTIPATION, UNSPECIFIED CONSTIPATION TYPE: Primary | ICD-10-CM

## 2018-09-26 LAB
ANION GAP SERPL CALC-SCNC: 9 MMOL/L (ref 5–15)
BASOPHILS # BLD: 0 K/UL (ref 0–0.1)
BASOPHILS NFR BLD: 0 % (ref 0–1)
BUN SERPL-MCNC: 12 MG/DL (ref 6–20)
BUN/CREAT SERPL: 12 (ref 12–20)
CALCIUM SERPL-MCNC: 8.6 MG/DL (ref 8.5–10.1)
CHLORIDE SERPL-SCNC: 104 MMOL/L (ref 97–108)
CO2 SERPL-SCNC: 28 MMOL/L (ref 21–32)
CREAT SERPL-MCNC: 0.99 MG/DL (ref 0.7–1.3)
DIFFERENTIAL METHOD BLD: NORMAL
EOSINOPHIL # BLD: 0.1 K/UL (ref 0–0.4)
EOSINOPHIL NFR BLD: 1 % (ref 0–7)
ERYTHROCYTE [DISTWIDTH] IN BLOOD BY AUTOMATED COUNT: 13.3 % (ref 11.5–14.5)
GLUCOSE SERPL-MCNC: 93 MG/DL (ref 65–100)
HCT VFR BLD AUTO: 42.5 % (ref 36.6–50.3)
HGB BLD-MCNC: 14.5 G/DL (ref 12.1–17)
IMM GRANULOCYTES # BLD: 0 K/UL (ref 0–0.04)
IMM GRANULOCYTES NFR BLD AUTO: 0 % (ref 0–0.5)
LYMPHOCYTES # BLD: 2.2 K/UL (ref 0.8–3.5)
LYMPHOCYTES NFR BLD: 28 % (ref 12–49)
MCH RBC QN AUTO: 30.4 PG (ref 26–34)
MCHC RBC AUTO-ENTMCNC: 34.1 G/DL (ref 30–36.5)
MCV RBC AUTO: 89.1 FL (ref 80–99)
MONOCYTES # BLD: 0.5 K/UL (ref 0–1)
MONOCYTES NFR BLD: 7 % (ref 5–13)
NEUTS SEG # BLD: 5 K/UL (ref 1.8–8)
NEUTS SEG NFR BLD: 64 % (ref 32–75)
NRBC # BLD: 0 K/UL (ref 0–0.01)
NRBC BLD-RTO: 0 PER 100 WBC
PLATELET # BLD AUTO: 241 K/UL (ref 150–400)
PMV BLD AUTO: 11.5 FL (ref 8.9–12.9)
POTASSIUM SERPL-SCNC: 3.3 MMOL/L (ref 3.5–5.1)
RBC # BLD AUTO: 4.77 M/UL (ref 4.1–5.7)
SODIUM SERPL-SCNC: 141 MMOL/L (ref 136–145)
WBC # BLD AUTO: 7.8 K/UL (ref 4.1–11.1)

## 2018-09-26 PROCEDURE — 94762 N-INVAS EAR/PLS OXIMTRY CONT: CPT

## 2018-09-26 PROCEDURE — 85652 RBC SED RATE AUTOMATED: CPT | Performed by: PHYSICIAN ASSISTANT

## 2018-09-26 PROCEDURE — 85025 COMPLETE CBC W/AUTO DIFF WBC: CPT | Performed by: PHYSICIAN ASSISTANT

## 2018-09-26 PROCEDURE — 96372 THER/PROPH/DIAG INJ SC/IM: CPT

## 2018-09-26 PROCEDURE — 80048 BASIC METABOLIC PNL TOTAL CA: CPT | Performed by: PHYSICIAN ASSISTANT

## 2018-09-26 PROCEDURE — 99283 EMERGENCY DEPT VISIT LOW MDM: CPT

## 2018-09-26 PROCEDURE — 74018 RADEX ABDOMEN 1 VIEW: CPT

## 2018-09-26 PROCEDURE — 74011250636 HC RX REV CODE- 250/636: Performed by: PHYSICIAN ASSISTANT

## 2018-09-26 PROCEDURE — 36415 COLL VENOUS BLD VENIPUNCTURE: CPT | Performed by: PHYSICIAN ASSISTANT

## 2018-09-26 PROCEDURE — 74011250637 HC RX REV CODE- 250/637: Performed by: PHYSICIAN ASSISTANT

## 2018-09-26 RX ORDER — DICYCLOMINE HYDROCHLORIDE 10 MG/1
10 CAPSULE ORAL 4 TIMES DAILY
Qty: 20 CAP | Refills: 0 | Status: SHIPPED | OUTPATIENT
Start: 2018-09-26 | End: 2018-09-26

## 2018-09-26 RX ORDER — ONDANSETRON 4 MG/1
4 TABLET, ORALLY DISINTEGRATING ORAL
Status: COMPLETED | OUTPATIENT
Start: 2018-09-26 | End: 2018-09-26

## 2018-09-26 RX ORDER — IBUPROFEN 800 MG/1
800 TABLET ORAL
Qty: 20 TAB | Refills: 0 | Status: SHIPPED | OUTPATIENT
Start: 2018-09-26 | End: 2018-10-03

## 2018-09-26 RX ORDER — KETOROLAC TROMETHAMINE 30 MG/ML
15 INJECTION, SOLUTION INTRAMUSCULAR; INTRAVENOUS
Status: COMPLETED | OUTPATIENT
Start: 2018-09-26 | End: 2018-09-26

## 2018-09-26 RX ORDER — IBUPROFEN 800 MG/1
800 TABLET ORAL
Qty: 20 TAB | Refills: 0 | Status: SHIPPED | OUTPATIENT
Start: 2018-09-26 | End: 2018-09-26

## 2018-09-26 RX ORDER — POLYETHYLENE GLYCOL 3350 17 G/17G
17 POWDER, FOR SOLUTION ORAL DAILY
Qty: 119 G | Refills: 0 | Status: SHIPPED | OUTPATIENT
Start: 2018-09-26 | End: 2018-09-26

## 2018-09-26 RX ORDER — DICYCLOMINE HYDROCHLORIDE 10 MG/1
10 CAPSULE ORAL 4 TIMES DAILY
Qty: 20 CAP | Refills: 0 | Status: SHIPPED | OUTPATIENT
Start: 2018-09-26 | End: 2018-10-01

## 2018-09-26 RX ADMIN — KETOROLAC TROMETHAMINE 15 MG: 30 INJECTION, SOLUTION INTRAMUSCULAR at 19:57

## 2018-09-26 RX ADMIN — ONDANSETRON 4 MG: 4 TABLET, ORALLY DISINTEGRATING ORAL at 19:57

## 2018-09-26 NOTE — ED PROVIDER NOTES
EMERGENCY DEPARTMENT HISTORY AND PHYSICAL EXAM 
 
Date: 9/26/2018 Patient Name: Felix Morrison History of Presenting Illness Chief Complaint Patient presents with  Abdominal Pain  
  since Sunday with diarrhea. Pt self treated for a bowel obstruction today and took magnesium citrate and saline enema and his abdominal pain started getting worse afterwards  Diarrhea HPI: Felix Morrison is a [address] [place] y.o. male with a PMHx of asthma presents to the ED for abd pain x 3 days. Pt says he has had constipation the past few days. His last BM was yesterday and says it was scant, watery, pebble like stool. Pts pain is a 8/10, constant, non radiating fullness. He denies inability to tolerate PO, vomiting, blood in stool, among other assoc sx's. PCP: Shan Ambrocio MD 
 
Current Outpatient Prescriptions Medication Sig Dispense Refill  ADVAIR DISKUS 100-50 mcg/dose diskus inhaler INHALE 1 PUFF BY MOUTH EVERY 12 HOURS 1 Inhaler 3  
 albuterol (VENTOLIN HFA) 90 mcg/actuation inhaler Take 2 Puffs by inhalation every four (4) hours as needed for Wheezing. 1 Inhaler 2  
 methylPREDNISolone (MEDROL DOSEPACK) 4 mg tablet Take as directed 1 Dose Pack 0  
 traMADol (ULTRAM) 50 mg tablet Take 50 mg by mouth every six (6) hours as needed for Pain. Past History Past Medical History: 
Past Medical History:  
Diagnosis Date  Asthma  Ill-defined condition L knee Past Surgical History: 
History reviewed. No pertinent surgical history. Family History: 
History reviewed. No pertinent family history. Social History: 
Social History Substance Use Topics  Smoking status: Never Smoker  Smokeless tobacco: Never Used  Alcohol use Yes Comment: socially Allergies: 
No Known Allergies Review of Systems Review of Systems Constitutional: Negative for chills, fever and unexpected weight change. Respiratory: Negative for shortness of breath. Cardiovascular: Negative for chest pain. Gastrointestinal: Positive for abdominal distention, abdominal pain and constipation. Negative for anal bleeding, blood in stool, diarrhea, nausea and vomiting. Musculoskeletal: Negative for arthralgias and myalgias. Skin: Negative for rash. Neurological: Negative for light-headedness and headaches. All other systems reviewed and are negative. Physical Exam  
 
Vitals:  
 09/26/18 1827 BP: (!) 133/103 Pulse: 77 Resp: 16 Temp: 97.8 °F (36.6 °C) SpO2: 100% Weight: 102.2 kg (225 lb 5 oz) Height: 5' 10\" (1.778 m) Physical Exam  
Constitutional: He is oriented to person, place, and time. He appears well-developed and well-nourished. HENT:  
Head: Normocephalic and atraumatic. Cardiovascular: Normal rate, regular rhythm and normal heart sounds. Exam reveals no gallop and no friction rub. No murmur heard. Pulmonary/Chest: Effort normal and breath sounds normal.  
Abdominal: Soft. Bowel sounds are normal. He exhibits distension. He exhibits no mass. There is no tenderness. There is no rebound and no guarding. Neurological: He is alert and oriented to person, place, and time. Skin: Skin is warm and dry. Psychiatric: He has a normal mood and affect. His behavior is normal. Judgment and thought content normal.  
 
 
 
Diagnostic Study Results Labs - No results found for this or any previous visit (from the past 12 hour(s)). Radiologic Studies -  
XR ABD (KUB)    (Results Pending) CT Results  (Last 48 hours) None CXR Results  (Last 48 hours) None Medical Decision Making I am the first provider for this patient. I reviewed the vital signs, available nursing notes, past medical history, past surgical history, family history and social history. Vital Signs-Reviewed the patient's vital signs. Records Reviewed: Nursing Notes and Old Medical Records ED Course: Initial assessment performed. The patients presenting problems have been discussed, and they are in agreement with the care plan formulated and outlined with them. I have encouraged them to ask questions as they arise throughout their visit. Available labs, imaging, and vital signs reviewed and read in full detail Vitals:  
 09/26/18 1827 BP: (!) 133/103 BP 1 Location: Left arm BP Patient Position: Sitting Pulse: 77 Resp: 16 Temp: 97.8 °F (36.6 °C) SpO2: 100% Weight: 102.2 kg (225 lb 5 oz) Height: 5' 10\" (1.778 m) On re evaluation pt is resting comfortably and is requesting discharge. Disposition: D/c home DISCHARGE NOTE: The patient has been re-evaluated and is ready for discharge. Patient has no new complaints, changes, or physical findings. I Counseled the patient on diagnosis and care plan. All available lab and imaging results have been reviewed by me and were discussed with the patient, including all incidental findings. The likelihood of other entities in the differential is insufficient to justify any further testing for them. This was explained to the patient. Patient agrees with plan and agrees to follow up with GI as recommended, or return to the ED if their symptoms worsen. All medications were reviewed with the patient; will d/c home with miralax. All of pt's questions and concerns were addressed. The patient was advised that new or worsening symptoms would require further evaluation and should prompt immediate return to the Emergency Department. Discharge instructions have been provided and explained to the patient, along with reasons to return to the ED. Patient voices understanding and is agreeable with the plan for discharge. Patient is ready to go home. Follow-up Information None Current Discharge Medication List  
  
 
 
Provider Notes (Medical Decision Making): DDx generalized abd pain: includes hepatitis, pancreatitis, cholecystitis, appendicitis, diverticulitis, obstruction, UTI, pyelonephritis, gastroenteritis, gastritis, AAA, perforated viscus (eg peptic ulcer, appendix, gallbladder, diverticulitis), intestinal ischemia, peritonitis, IBS, constipation, physical or sexual abuse, abdominal abscess, ruptured spleen, IBD, diabetic ketoacidosis, hypercalcemia, uremia, parasitic infection (eg giardia, strongyloides, ascaris, tapeworms), adrenal insufficiency, lead poisoning, iron toxicity, polyarteritis nodosa, Henoch-Schonelein purpura, porphyria, black  spider bite Some of these diagnoses need further lab and imaging that is not available in the ER or is not indicated at this time. Likely pt has constipation given hx, physical and workup today. Procedures: 
Procedures Diagnosis Clinical Impression: No diagnosis found.

## 2018-09-27 ENCOUNTER — OFFICE VISIT (OUTPATIENT)
Dept: INTERNAL MEDICINE CLINIC | Age: 33
End: 2018-09-27

## 2018-09-27 VITALS
DIASTOLIC BLOOD PRESSURE: 77 MMHG | WEIGHT: 226 LBS | SYSTOLIC BLOOD PRESSURE: 124 MMHG | OXYGEN SATURATION: 98 % | HEIGHT: 70 IN | BODY MASS INDEX: 32.35 KG/M2 | HEART RATE: 67 BPM | TEMPERATURE: 98.4 F | RESPIRATION RATE: 16 BRPM

## 2018-09-27 DIAGNOSIS — R10.30 LOWER ABDOMINAL PAIN: Primary | ICD-10-CM

## 2018-09-27 LAB — ERYTHROCYTE [SEDIMENTATION RATE] IN BLOOD: 1 MM/HR (ref 0–15)

## 2018-09-27 NOTE — PROGRESS NOTES
Chief Complaint Patient presents with  Abdominal Pain  
 
he is a 34y.o. year old male who presents for follow-up of ER follow up. Patient has been having abdominal pain for a couple days now. Patient has been going back and forth between constipation and diarrhea. Patient has a GI follow-up scheduled for next Tuesday. States he has 10 pain throughout the day but has eased off as he is coming to clinic. He said 2 episodes of watery diarrhea. Patient labs were drawn yesterday, but no liver enzymes or lipase drawn. We will repeat that today. Denies any blood in stool nausea vomiting. Has not taken Bentyl as prescribed yesterday Reviewed and agree with Nurse Note and duplicated in this note. Reviewed PmHx, RxHx, FmHx, SocHx, AllgHx and updated and dated in the chart. History reviewed. No pertinent family history. Past Medical History:  
Diagnosis Date  Asthma  Ill-defined condition L knee Social History Social History  Marital status:  Spouse name: N/A  
 Number of children: 2  
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  
 Sexual activity: Not Asked Other Topics Concern  None Social History Narrative Confucianist kai, Attends Speaking [employer] Review of Systems - negative except as listed above Objective:  
 
Vitals:  
 09/27/18 1610 BP: 124/77 Pulse: 67 Resp: 16 Temp: 98.4 °F (36.9 °C) TempSrc: Oral  
SpO2: 98% Weight: 226 lb (102.5 kg) Height: 5' 10\" (1.778 m) Physical Examination: General appearance - alert, well appearing, and in no distress Chest - clear to auscultation, no wheezes, rales or rhonchi, symmetric air entry Heart - normal rate, regular rhythm, normal S1, S2, no murmurs, rubs, clicks or gallops Abdomen - tenderness noted right and left lower quadrants, positive McBurney's point tenderness Neurological - alert, oriented, normal speech, no focal findings or movement disorder noted Musculoskeletal - no joint tenderness, deformity or swelling Extremities - peripheral pulses normal, no pedal edema, no clubbing or cyanosis Skin - normal coloration and turgor, no rashes, no suspicious skin lesions noted Assessment/ Plan:  
Diagnoses and all orders for this visit: 1. Lower abdominal pain -     AMYLASE 
-     LIPASE 
-     METABOLIC PANEL, COMPREHENSIVE 
-     CT ABD PELV W CONT; Future If imaging is normal, patient will continue fiber supplementation and follow-up with GI next week. Follow-up Disposition: 
Return if symptoms worsen or fail to improve. Patient was informed/counseled on: There is no height or weight on file to calculate BMI. Discussed the patient's I have reviewed/discussed the above normal BMI with the patient. I have recommended the following interventions: dietary management education, guidance, and counseling . 1) Remember to stay active and/or exercise regularly (I suggest 30-45 minutes daily) 2) For reliable dietary information, go to www. EATRIGHT.org. You may wish to consider seeing the nutritionist at [facility] [phone], also consider the [address] [facility]. 3) I routinely suggest a complete physical exam once each year (your birth month) I have discussed the diagnosis with the patient and the intended plan as seen in the above orders. The patient has received an after-visit summary and questions were answered concerning future plans. Medication Side Effects and Warnings were discussed with patient: yes Patient Labs were reviewed and or requested: yes Patient Past Records were reviewed and or requested  yes I have discussed the diagnosis with the patient and the intended plan as seen in the above orders. Pt agrees to call or return to clinic and/or go to closest ER with any worsening of symptoms. This may include, but not limited to increased fever (>100.4) with NSAIDS or Tylenol, increased edema, confusion, rash, worsening of presenting symptoms.

## 2018-09-27 NOTE — DISCHARGE INSTRUCTIONS
Constipation: Care Instructions  Your Care Instructions    Constipation means that you have a hard time passing stools (bowel movements). People pass stools from 3 times a day to once every 3 days. What is normal for you may be different. Constipation may occur with pain in the rectum and cramping. The pain may get worse when you try to pass stools. Sometimes there are small amounts of bright red blood on toilet paper or the surface of stools. This is because of enlarged veins near the rectum (hemorrhoids). A few changes in your diet and lifestyle may help you avoid ongoing constipation. Your doctor may also prescribe medicine to help loosen your stool. Some medicines can cause constipation. These include pain medicines and antidepressants. Tell your doctor about all the medicines you take. Your doctor may want to make a medicine change to ease your symptoms. Follow-up care is a key part of your treatment and safety. Be sure to make and go to all appointments, and call your doctor if you are having problems. It's also a good idea to know your test results and keep a list of the medicines you take. How can you care for yourself at home? · Drink plenty of fluids, enough so that your urine is light yellow or clear like water. If you have kidney, heart, or liver disease and have to limit fluids, talk with your doctor before you increase the amount of fluids you drink. · Include high-fiber foods in your diet each day. These include fruits, vegetables, beans, and whole grains. · Get at least 30 minutes of exercise on most days of the week. Walking is a good choice. You also may want to do other activities, such as running, swimming, cycling, or playing tennis or team sports. · Take a fiber supplement, such as Citrucel or Metamucil, every day. Read and follow all instructions on the label. · Schedule time each day for a bowel movement. A daily routine may help.  Take your time having your bowel movement. · Support your feet with a small step stool when you sit on the toilet. This helps flex your hips and places your pelvis in a squatting position. · Your doctor may recommend an over-the-counter laxative to relieve your constipation. Examples are Milk of Magnesia and MiraLax. Read and follow all instructions on the label. Do not use laxatives on a long-term basis. When should you call for help? Call your doctor now or seek immediate medical care if:    · You have new or worse belly pain.     · You have new or worse nausea or vomiting.     · You have blood in your stools.    Watch closely for changes in your health, and be sure to contact your doctor if:    · Your constipation is getting worse.     · You do not get better as expected. Where can you learn more? Go to http://remy-roz.info/. Enter 21 [id number] in the search box to learn more about \"Constipation: Care Instructions. \"  Current as of: November 20, 2017  Content Version: 11.7  © 1349-1439 [employer]. Care instructions adapted under license by [employer] (which disclaims liability or warranty for this information). If you have questions about a medical condition or this instruction, always ask your healthcare professional. [address] any warranty or liability for your use of this information.

## 2018-09-27 NOTE — MR AVS SNAPSHOT
Howard Lowery 
 
 
 [address] [address] 
[phone] Patient: Marco Duncan MRN: [id number] :1985 Visit Information Date & Time Provider Department Dept. Phone Encounter #  
 2018  3:45 PM [address] [place] [address] MD Diane [address] [place] [address] and [place] [address] [id number] Follow-up Instructions Return if symptoms worsen or fail to improve. Follow-up and Disposition History Upcoming Health Maintenance Date Due Influenza Age 5 to Adult 3/31/2019* DTaP/Tdap/Td series (1 - Tdap) 2019* Pneumococcal 19-64 Medium Risk (1 of 1 - PPSV23) 2019* *Topic was postponed. The date shown is not the original due date. Allergies as of 2018  Review Complete On: 2018 By: [address] [place] [address] MD Diane  
 No Known Allergies Current Immunizations  Never Reviewed No immunizations on file. Not reviewed this visit You Were Diagnosed With   
  
 Codes Comments Lower abdominal pain    -  Primary ICD-10-CM: R10.30 ICD-9-CM: 789.09 Vitals BP Pulse Temp Resp Height(growth percentile) Weight(growth percentile) 124/77 (BP 1 Location: Right arm, BP Patient Position: Sitting) 67 98.4 °F (36.9 °C) (Oral) 16 5' 10\" (1.778 m) 226 lb (102.5 kg) SpO2 BMI Smoking Status 98% 32.43 kg/m2 Never Smoker Vitals History BMI and BSA Data Body Mass Index Body Surface Area  
 32.43 kg/m 2 2.25 m 2 Preferred Pharmacy Pharmacy Name Phone [facility] [address]  [place] [phone] Your Updated Medication List  
  
   
This list is accurate as of 18  4:43 PM.  Always use your most recent med list.  
  
  
  
  
 ADVAIR DISKUS 100-50 mcg/dose diskus inhaler Generic drug:  fluticasone-salmeterol INHALE 1 PUFF BY MOUTH EVERY 12 HOURS  
  
 albuterol 90 mcg/actuation inhaler Commonly known as:  VENTOLIN HFA Take 2 Puffs by inhalation every four (4) hours as needed for Wheezing. dicyclomine 10 mg capsule Commonly known as:  BENTYL Take 1 Cap by mouth four (4) times daily for 5 days. ibuprofen 800 mg tablet Commonly known as:  MOTRIN Take 1 Tab by mouth every six (6) hours as needed for Pain for up to 7 days. methylPREDNISolone 4 mg tablet Commonly known as:  Maral McGill Take as directed ULTRAM 50 mg tablet Generic drug:  traMADol Take 50 mg by mouth every six (6) hours as needed for Pain. We Performed the Following AMYLASE [id number] CPT(R)] LIPASE Q450827 CPT(R)] METABOLIC PANEL, COMPREHENSIVE [52679 CPT(R)] Follow-up Instructions Return if symptoms worsen or fail to improve. To-Do List   
 09/27/2018 Imaging:  CT ABD PELV W CONT   
  
 10/02/2018 8:15 AM  
  Appointment with [facility] CT ER 2 at [facility] [address] ([phone]) CONTRAST STUDY: 1. The patient should not eat solid food four hours before the appointment but should be encouraged to drink clear liquids. 2.  If you have to drink oral contrast, please pick it up any weekday prior to your appointment, if you cannot please check in 2 hrs before appt time. 3.  The patient will require IV access for contrast administration. 4.  The patient should not take Ibuprofen (Advil, Motrin, etc.) and Naproxen Sodium (Aleve, etc.)  on the day of the exam. Stopping non-steroidal anti-inflammatory agents (NSAIDs) like Ibuprofen decreases the risk of kidney damage from the x-ray contrast (dye). 5.  [facility] any non [facility] films/images pertaining to the area of interest with you on the day of appointment. 6.  Bring current lab work if available (within last 90 days CMP) 7. Check in at registration at least 30 minutes before appt time unless you were instructed to do otherwise. Introducing [facility] SERVICES! Zohreh Valle introduces Sky[employer] patient portal. Now you can access parts of your medical record, email your doctor's office, and request medication refills online. 1. In your internet browser, go to [email or web address] 2. Click on the First Time User? Click Here link in the Sign In box. You will see the New Member Sign Up page. 3. Enter your Skyeng Access Code exactly as it appears below. You will not need to use this code after youve completed the sign-up process. If you do not sign up before the expiration date, you must request a new code. · Sky[employer] Access Code: [email or web address] Expires: 10/17/2018  4:21 PM 
 
4. Enter the last four digits of your Social Security Number (xxxx) and Date of Birth (mm/dd/yyyy) as indicated and click Submit. You will be taken to the next sign-up page. 5. Create a Skyeng ID. This will be your Skyeng login ID and cannot be changed, so think of one that is secure and easy to remember. 6. Create a Skyeng password. You can change your password at any time. 7. Enter your Password Reset Question and Answer. This can be used at a later time if you forget your password. 8. Enter your e-mail address. You will receive e-mail notification when new information is available in [address] 9. Click Sign Up. You can now view and download portions of your medical record. 10. Click the Download Summary menu link to download a portable copy of your medical information. If you have questions, please visit the Frequently Asked Questions section of the Skyeng website. Remember, Skyeng is NOT to be used for urgent needs. For medical emergencies, dial 911. Now available from your iPhone and Android! Please provide this summary of care documentation to your next provider. Your primary care clinician is listed as [facility] . If you have any questions after today's visit, please call [phone].

## 2018-09-28 LAB
ALBUMIN SERPL-MCNC: 4.9 G/DL (ref 3.5–5.5)
ALBUMIN/GLOB SERPL: 2 {RATIO} (ref 1.2–2.2)
ALP SERPL-CCNC: 72 IU/L (ref 39–117)
ALT SERPL-CCNC: 29 IU/L (ref 0–44)
AMYLASE SERPL-CCNC: 67 U/L (ref 31–124)
AST SERPL-CCNC: 32 IU/L (ref 0–40)
BILIRUB SERPL-MCNC: 0.3 MG/DL (ref 0–1.2)
BUN SERPL-MCNC: 9 MG/DL (ref 6–20)
BUN/CREAT SERPL: 10 (ref 9–20)
CALCIUM SERPL-MCNC: 9.5 MG/DL (ref 8.7–10.2)
CHLORIDE SERPL-SCNC: 101 MMOL/L (ref 96–106)
CO2 SERPL-SCNC: 25 MMOL/L (ref 20–29)
CREAT SERPL-MCNC: 0.94 MG/DL (ref 0.76–1.27)
GLOBULIN SER CALC-MCNC: 2.4 G/DL (ref 1.5–4.5)
GLUCOSE SERPL-MCNC: 87 MG/DL (ref 65–99)
LIPASE SERPL-CCNC: 32 U/L (ref 13–78)
POTASSIUM SERPL-SCNC: 3.8 MMOL/L (ref 3.5–5.2)
PROT SERPL-MCNC: 7.3 G/DL (ref 6–8.5)
SODIUM SERPL-SCNC: 141 MMOL/L (ref 134–144)

## 2018-12-24 RX ORDER — CEPHALEXIN 250 MG/1
CAPSULE ORAL
Qty: 1 INHALER | Refills: 6 | Status: SHIPPED | OUTPATIENT
Start: 2018-12-24 | End: 2019-12-19 | Stop reason: SDUPTHER

## 2019-05-13 RX ORDER — ALBUTEROL SULFATE 0.83 MG/ML
2.5 SOLUTION RESPIRATORY (INHALATION)
Qty: 24 EACH | Refills: 0 | Status: SHIPPED | OUTPATIENT
Start: 2019-05-13 | End: 2019-12-19 | Stop reason: SDUPTHER

## 2019-05-13 RX ORDER — ALBUTEROL SULFATE 90 UG/1
2 AEROSOL, METERED RESPIRATORY (INHALATION)
Qty: 1 INHALER | Refills: 2 | Status: SHIPPED | OUTPATIENT
Start: 2019-05-13 | End: 2021-10-07 | Stop reason: SDUPTHER

## 2019-12-19 RX ORDER — FLUTICASONE PROPIONATE AND SALMETEROL 100; 50 UG/1; UG/1
POWDER RESPIRATORY (INHALATION)
Qty: 1 INHALER | Refills: 6 | Status: SHIPPED | OUTPATIENT
Start: 2019-12-19

## 2019-12-19 RX ORDER — ALBUTEROL SULFATE 0.83 MG/ML
2.5 SOLUTION RESPIRATORY (INHALATION)
Qty: 24 EACH | Refills: 0 | Status: SHIPPED | OUTPATIENT
Start: 2019-12-19 | End: 2021-10-07 | Stop reason: SDUPTHER

## 2020-01-17 ENCOUNTER — OFFICE VISIT (OUTPATIENT)
Dept: INTERNAL MEDICINE CLINIC | Age: 35
End: 2020-01-17

## 2020-01-17 VITALS
OXYGEN SATURATION: 98 % | SYSTOLIC BLOOD PRESSURE: 134 MMHG | HEIGHT: 70 IN | TEMPERATURE: 97.7 F | HEART RATE: 77 BPM | RESPIRATION RATE: 18 BRPM | DIASTOLIC BLOOD PRESSURE: 79 MMHG | BODY MASS INDEX: 33.21 KG/M2 | WEIGHT: 232 LBS

## 2020-01-17 DIAGNOSIS — M22.2X2 PATELLOFEMORAL PAIN SYNDROME OF BOTH KNEES: ICD-10-CM

## 2020-01-17 DIAGNOSIS — M22.2X1 PATELLOFEMORAL PAIN SYNDROME OF BOTH KNEES: ICD-10-CM

## 2020-01-17 DIAGNOSIS — J40 BRONCHITIS: Primary | ICD-10-CM

## 2020-01-17 RX ORDER — AZITHROMYCIN 250 MG/1
TABLET, FILM COATED ORAL
Qty: 6 TAB | Refills: 0 | Status: SHIPPED | OUTPATIENT
Start: 2020-01-17 | End: 2020-01-22

## 2020-01-17 NOTE — PATIENT INSTRUCTIONS
Viral Respiratory Infection: Care Instructions Your Care Instructions Viruses are very small organisms. They grow in number after they enter your body. There are many types that cause different illnesses, such as colds and the mumps. The symptoms of a viral respiratory infection often start quickly. They include a fever, sore throat, and runny nose. You may also just not feel well. Or you may not want to eat much. Most viral respiratory infections are not serious. They usually get better with time and self-care. Antibiotics are not used to treat a viral infection. That's because antibiotics will not help cure a viral illness. In some cases, antiviral medicine can help your body fight a serious viral infection. Follow-up care is a key part of your treatment and safety. Be sure to make and go to all appointments, and call your doctor if you are having problems. It's also a good idea to know your test results and keep a list of the medicines you take. How can you care for yourself at home? · Rest as much as possible until you feel better. · Be safe with medicines. Take your medicine exactly as prescribed. Call your doctor if you think you are having a problem with your medicine. You will get more details on the specific medicine your doctor prescribes. · Take an over-the-counter pain medicine, such as acetaminophen (Tylenol), ibuprofen (Advil, Motrin), or naproxen (Aleve), as needed for pain and fever. Read and follow all instructions on the label. Do not give aspirin to anyone younger than 20. It has been linked to Reye syndrome, a serious illness. · Drink plenty of fluids, enough so that your urine is light yellow or clear like water. Hot fluids, such as tea or soup, may help relieve congestion in your nose and throat. If you have kidney, heart, or liver disease and have to limit fluids, talk with your doctor before you increase the amount of fluids you drink. · Try to clear mucus from your lungs by breathing deeply and coughing. · Gargle with warm salt water once an hour. This can help reduce swelling and throat pain. Use 1 teaspoon of salt mixed in 1 cup of warm water. · Do not smoke or allow others to smoke around you. If you need help quitting, talk to your doctor about stop-smoking programs and medicines. These can increase your chances of quitting for good. To avoid spreading the virus · Cough or sneeze into a tissue. Then throw the tissue away. · If you don't have a tissue, use your hand to cover your cough or sneeze. Then clean your hand. You can also cough into your sleeve. · Wash your hands often. Use soap and warm water. Wash for 15 to 20 seconds each time. · If you don't have soap and water near you, you can clean your hands with alcohol wipes or gel. When should you call for help? Call your doctor now or seek immediate medical care if: 
  · You have a new or higher fever.  
  · Your fever lasts more than 48 hours.  
  · You have trouble breathing.  
  · You have a fever with a stiff neck or a severe headache.  
  · You are sensitive to light.  
  · You feel very sleepy or confused.  
 Watch closely for changes in your health, and be sure to contact your doctor if: 
  · You do not get better as expected. Where can you learn more? Go to http://remy-roz.info/. Enter T944 in the search box to learn more about \"Viral Respiratory Infection: Care Instructions. \" Current as of: June 9, 2019 Content Version: 12.2 © 0469-7633 [employer]. Care instructions adapted under license by [employer] (which disclaims liability or warranty for this information). If you have questions about a medical condition or this instruction, always ask your healthcare professional. [address] any warranty or liability for your use of this information.

## 2020-01-17 NOTE — PROGRESS NOTES
Chief Complaint   Patient presents with    Cold Symptoms     he is a 30y.o. year old male who presents for evaluation of cough  productive cough for 3 weeks. NOlow grade fevers. no nausea and no vomiting . No congestion, sneezing, nasal blockage, post nasal drip, myalgias, headache, itching in eyes, fever and chills. Over-the-counter remedies including:mucinex and albuterol   has been used with poor relief of symptoms. Hx Asthma:  yes  Smoker:  no  Contacts with similar infections: yes   Recent travel:no   Sputum Description: yellow and rust color    Patient also states that he is got bilateral knee pain going on since his surgery done 2017. States that he has not been exercising recently has not been on his home therapy exercises either. He has been busy with school work and kids. States whenever he bends his knees or his on his knees he has a lot of pain around his kneecaps bilaterally. Right is slightly worse than his left. Patient denies any trauma or radiation of pain. Reviewed and agree with Nurse Note and duplicated in this note. Reviewed PmHx, RxHx, FmHx, SocHx, AllgHx and updated and dated in the chart. History reviewed. No pertinent family history.     Past Medical History:   Diagnosis Date    Asthma     Ill-defined condition     L knee      Social History     Socioeconomic History    Marital status:      Spouse name: Not on file    Number of children: 2    Years of education: Not on file    Highest education level: Not on file   Occupational History    Occupation: nurse     Comment: [employer]   Tobacco Use    Smoking status: Never Smoker    Smokeless tobacco: Never Used   Substance and Sexual Activity    Alcohol use: Yes     Comment: socially    Drug use: No   Social History Narrative    Chris quinn, Attends [employer]        Review of Systems - negative except as listed above      Objective:     Vitals:    01/17/20 1038   BP: 134/79   Pulse: 77   Resp: 18   Temp: 97.7 °F (36.5 °C)   TempSrc: Oral   SpO2: 98%   Weight: 232 lb (105.2 kg)   Height: 5' 10\" (1.778 m)       Physical Examination: General appearance - alert, well appearing, and in no distress  Eyes - pupils equal and reactive, extraocular eye movements intact  Ears - bilateral TM's and external ear canals normal  Nose - normal and patent, no erythema, discharge or polyps  Mouth - mucous membranes moist, pharynx normal without lesions  Neck - supple, no significant adenopathy  Chest - clear to auscultation, no wheezes, rales or rhonchi, symmetric air entry  Heart - normal rate, regular rhythm, normal S1, S2, no murmurs, rubs, clicks or gallops  Abdomen - soft, nontender, nondistended, no masses or organomegaly  Neurological - alert, oriented, normal speech, no focal findings or movement disorder noted  Musculoskeletal - no joint tenderness, deformity or swelling  Extremities - peripheral pulses normal, no pedal edema, no clubbing or cyanosis  Skin - normal coloration and turgor, no rashes, no suspicious skin lesions noted    Assessment/ Plan:   Diagnoses and all orders for this visit:    1. Bronchitis    2. Patellofemoral pain syndrome of both knees  -     REFERRAL TO PHYSICAL THERAPY    Other orders  -     azithromycin (ZITHROMAX) 250 mg tablet; Take 2 tablets today, then take 1 tablet daily           Adults:  For nasal congestion, cough and cold/flu symptoms I advised:    - Seek medical care if symptoms become more severe or if you develop      chest pain, shortness of breath, confusion.    - Contact us if your symptoms fail to improve after 7-10 days   - Rest as much as possible and stay home from work/school at least 24 hours                  after last fever              - Wash hand frequently and cough/sneeze into your sleeve to help prevent       infection of others   - Drink plenty of fluids   - Ibuprofen (Advil, Motrin) 400-800mg every 6 hours or                  Aleve 220 mg 1-2 pills every 8 hours for fever, headache, pain   - Tylenol extra strength 500 mg every 6 hours for pain, headache, fever   - Nasal saline rinses 2-3 times daily for nasal congestion   - Mucinex 1200 mg twice daily or Guaifenesin 400 mg every 4 hours for chest       congestion              - Robitussin DM or Delsym for cough(suppress cough and thin mucus. )   - Cepacol throat lozenges and saline gargles (1 tsp salt in 8 oz water) for sore       throat   - Tea with honey for cough (buckwheat honey preferred)              - Benadryl (diphenhydramine) 50 mg at night for nasal congestion/allergies   - Pseudoephedrine 12-hour tablets twice daily for nasal and inner ear       -Ask your pharmacist (this is kept behind the counter)     -If you have high blood pressure or heart disease, use this        medication with caution (ask your doctor), alternative coricidin   - Afrin (oxymetazoline) nasal spray 2 sprays in each nostril twice daily for severe      congestion.       -Do not use this medication for more than 3 days as it may cause         \"rebound congestion\". -If you have high blood pressure or heart disease, use this medication       with caution (ask your doctor)      Children:   Sit in bathroom with hot shower running for steam.    Nasal saline rinses and Neti pot  In general for viral respiratory infection -  Aim for drainage/increased airflow  Increase fluids - Pedialyte popsicles, Gatorade mixed with 50% water      1) Remember to stay active and/or exercise regularly (I suggest 30-45 minutes daily)   2) For reliable dietary information, go to www. EATRIGHT.org. You may wish to consider seeing the nutritionist at [facility] [phone], also consider the [address] I have discussed the diagnosis with the patient and the intended plan as seen in the above orders. The patient has received an after-visit summary and questions were answered concerning future plans.      Medication Side Effects and Warnings were discussed with patient: yes  Patient Labs were reviewed and or requested: yes  Patient Past Records were reviewed and or requested  yes  I have discussed the diagnosis with the patient and the intended plan as seen in the above orders. The patient has received an after-visit summary and questions were answered concerning future plans. Pt agrees to call or return to clinic and/or go to closest ER with any worsening of symptoms. This may include, but not limited to increased fever (>100.4) with NSAIDS or Tylenol, increased edema, confusion, rash, worsening of presenting symptoms.

## 2020-02-14 ENCOUNTER — OFFICE VISIT (OUTPATIENT)
Dept: INTERNAL MEDICINE CLINIC | Age: 35
End: 2020-02-14

## 2020-02-14 VITALS
WEIGHT: 232.8 LBS | OXYGEN SATURATION: 95 % | HEART RATE: 73 BPM | BODY MASS INDEX: 33.33 KG/M2 | TEMPERATURE: 98.6 F | HEIGHT: 70 IN | SYSTOLIC BLOOD PRESSURE: 117 MMHG | DIASTOLIC BLOOD PRESSURE: 82 MMHG | RESPIRATION RATE: 16 BRPM

## 2020-02-14 DIAGNOSIS — Z00.00 VISIT FOR WELL MAN HEALTH CHECK: Primary | ICD-10-CM

## 2020-02-14 DIAGNOSIS — R05.9 COUGH: ICD-10-CM

## 2020-02-14 RX ORDER — MONTELUKAST SODIUM 10 MG/1
10 TABLET ORAL DAILY
Qty: 30 TAB | Refills: 1 | Status: SHIPPED | OUTPATIENT
Start: 2020-02-14 | End: 2020-05-20 | Stop reason: SDUPTHER

## 2020-02-14 NOTE — PROGRESS NOTES
Chief Complaint   Patient presents with    Cold Symptoms     he is a 30y.o. year old male who presents for CPE. Complete Physical Exam Questions:    1. Do you follow a low fat diet? yes  2. Are you up to date on your Tdap (<10 years)? Yes  3. Have you ever had a Pneumovax vaccine (>65)? Not applicable   CTW28 Not applicable   COHE97 Not applicable  4. Have you had Zoster vaccine (>60)? Not applicable  5. Have you had the HPV - Gardasil (13- 26)? No  6. Do you follow an exercise program?  yes  7. Do you smoke?  no If > 65 and smoker, have you had a abdominal aortic aneurysm ultrasound screen? No  8. Do you consider yourself overweight?  yes  9. Is there a family history of CAD< age 48? No  10. Is there a family history of Cancer? No  11. Do you know your Cancer risks? No  12. Have you had a colonoscopy? Not applicable  13. Have you been tested for HIV or other STI's? No HIV today(18-64 y/o)? No   14. Have you had an EKG in the last five years(>50)? No  15. Have you had a PSA test done this year (50-69)? No    Patient states that he is feeling much better but does have a residual cough. He is taking his antibiotics as prescribed and start taking Allegra over-the-counter. Denies any chest pain    Reviewed and agree with Nurse Note and duplicated in this note. Reviewed PmHx, RxHx, FmHx, SocHx, AllgHx and updated and dated in the chart. History reviewed. No pertinent family history.     Past Medical History:   Diagnosis Date    Asthma     Ill-defined condition     L knee      Social History     Socioeconomic History    Marital status:      Spouse name: Not on file    Number of children: 2    Years of education: Not on file    Highest education level: Not on file   Occupational History    Occupation: nurse     Comment: [employer]   Tobacco Use    Smoking status: Never Smoker    Smokeless tobacco: Never Used   Substance and Sexual Activity    Alcohol use: Yes     Comment: socially    Drug use: No   Social History Narrative    Meryi kai, Attends [employer]        Review of Systems - negative except as listed above      Objective:     Vitals:    02/14/20 0927   BP: 117/82   Pulse: 73   Resp: 16   Temp: 98.6 °F (37 °C)   TempSrc: Oral   SpO2: 95%   Weight: 232 lb 12.8 oz (105.6 kg)   Height: 5' 10\" (1.778 m)       Physical Examination: General appearance - alert, well appearing, and in no distress  Eyes - pupils equal and reactive, extraocular eye movements intact  Ears - bilateral TM's and external ear canals normal  Nose - normal and patent, no erythema, discharge or polyps  Mouth - mucous membranes moist, pharynx normal without lesions  Neck - supple, no significant adenopathy  Chest - clear to auscultation, no wheezes, rales or rhonchi, symmetric air entry  Heart - normal rate, regular rhythm, normal S1, S2, no murmurs, rubs, clicks or gallops  Abdomen - soft, nontender, nondistended, no masses or organomegaly  Neurological - alert, oriented, normal speech, no focal findings or movement disorder noted  Musculoskeletal - no joint tenderness, deformity or swelling  Extremities - peripheral pulses normal, no pedal edema, no clubbing or cyanosis  Skin - normal coloration and turgor, no rashes, no suspicious skin lesions noted      Assessment/ Plan:   Diagnoses and all orders for this visit:    1. Visit for well [facility] health check  -     XR CHEST PA LAT; Future  -     LIPID PANEL  -     METABOLIC PANEL, COMPREHENSIVE  -     CBC W/O DIFF    2. Cough  -     XR CHEST PA LAT; Future           Labs to be drawn: CBC, CMP, Lipid      I have discussed the diagnosis with the patient and the intended plan as seen in the above orders. The patient has received an after-visit summary and questions were answered concerning future plans.      Medication Side Effects and Warnings were discussed with patient,  Patient Labs were reviewed and or requested, and  Patient Past Records were reviewed and or requested  yes         Pt agrees to call or return to clinic and/or go to closest ER with any worsening of symptoms. This may include, but not limited to increased fever (>100.4) with NSAIDS or Tylenol, increased edema, confusion, rash, worsening of presenting symptoms.

## 2020-02-15 LAB
ALBUMIN SERPL-MCNC: 5 G/DL (ref 4–5)
ALBUMIN/GLOB SERPL: 1.9 {RATIO} (ref 1.2–2.2)
ALP SERPL-CCNC: 82 IU/L (ref 39–117)
ALT SERPL-CCNC: 31 IU/L (ref 0–44)
AST SERPL-CCNC: 29 IU/L (ref 0–40)
BILIRUB SERPL-MCNC: 0.5 MG/DL (ref 0–1.2)
BUN SERPL-MCNC: 12 MG/DL (ref 6–20)
BUN/CREAT SERPL: 13 (ref 9–20)
CALCIUM SERPL-MCNC: 10.3 MG/DL (ref 8.7–10.2)
CHLORIDE SERPL-SCNC: 101 MMOL/L (ref 96–106)
CHOLEST SERPL-MCNC: 172 MG/DL (ref 100–199)
CO2 SERPL-SCNC: 22 MMOL/L (ref 20–29)
CREAT SERPL-MCNC: 0.89 MG/DL (ref 0.76–1.27)
ERYTHROCYTE [DISTWIDTH] IN BLOOD BY AUTOMATED COUNT: 13 % (ref 11.6–15.4)
GLOBULIN SER CALC-MCNC: 2.6 G/DL (ref 1.5–4.5)
GLUCOSE SERPL-MCNC: 97 MG/DL (ref 65–99)
HCT VFR BLD AUTO: 46.9 % (ref 37.5–51)
HDLC SERPL-MCNC: 46 MG/DL
HGB BLD-MCNC: 15.8 G/DL (ref 13–17.7)
LDLC SERPL CALC-MCNC: 108 MG/DL (ref 0–99)
MCH RBC QN AUTO: 30.3 PG (ref 26.6–33)
MCHC RBC AUTO-ENTMCNC: 33.7 G/DL (ref 31.5–35.7)
MCV RBC AUTO: 90 FL (ref 79–97)
PLATELET # BLD AUTO: 287 X10E3/UL (ref 150–450)
POTASSIUM SERPL-SCNC: 4.9 MMOL/L (ref 3.5–5.2)
PROT SERPL-MCNC: 7.6 G/DL (ref 6–8.5)
RBC # BLD AUTO: 5.22 X10E6/UL (ref 4.14–5.8)
SODIUM SERPL-SCNC: 140 MMOL/L (ref 134–144)
TRIGL SERPL-MCNC: 89 MG/DL (ref 0–149)
VLDLC SERPL CALC-MCNC: 18 MG/DL (ref 5–40)
WBC # BLD AUTO: 6.5 X10E3/UL (ref 3.4–10.8)

## 2020-02-17 NOTE — PROGRESS NOTES
Your \"Bad\" cholesterol (LDL and/or triglycerides) are slightly elevated. Please eat a healthier diet as described below. In particular avoid fried, fatty and junk foods, while increasing fiber (fruits and vegetables). If you cannot increase fiber through diet, you can supplement with metamucil as directed on bottle daily. Also, make sure you are taking 1 to 2 grams of over the counter fish oil. Increase exercise to 5 times per week of cardio lasting at least 30 min's each (biking, walking, elliptical, swimming). Lets recheck the fasting (atleast eight hours) in 6 months. Mediterranean diet: Choose this heart-healthy diet option  The Mediterranean diet is a heart-healthy eating plan combining elements of Mediterranean-style cooking. Here's how to adopt the Mediterranean diet. If you're looking for a heart-healthy eating plan, the Mediterranean diet might be right for you. The Mediterranean diet incorporates the basics of healthy eating  plus a splash of flavorful olive oil and perhaps a glass of red wine  among other components characterizing the traditional cooking style of countries bordering the [facility]. Most healthy diets include fruits, vegetables, fish and whole grains, and limit unhealthy fats. While these parts of a healthy diet remain tried-and-true, subtle variations or differences in proportions of certain foods may make a difference in your risk of heart disease. Benefits of the [address] has shown that the traditional Mediterranean diet reduces the risk of heart disease. In fact, a recent analysis of more than 1.5 million healthy adults demonstrated that following a Mediterranean diet was associated with a reduced risk of overall and cardiovascular mortality, a reduced incidence of cancer and cancer mortality, and a reduced incidence of Parkinson's and Alzheimer's diseases.    For this reason, most if not all major scientific organizations encourage healthy adults to adapt a style of eating like that of the [address] [facility] for prevention of major chronic diseases. Key components of the Mediterranean diet  The Mediterranean diet emphasizes:   Getting plenty of exercise   Eating primarily plant-based foods, such as fruits and vegetables, whole grains, legumes and nuts   Replacing butter with healthy fats such as olive oil and canola oil   Using herbs and spices instead of salt to flavor foods   Limiting red meat to no more than a few times a month   Eating fish and poultry at least twice a week   Drinking red wine in moderation (optional)   The diet also recognizes the importance of enjoying meals with family and friends. Fruits, vegetables, nuts and grains  The Mediterranean diet traditionally includes fruits, vegetables, pasta and rice. For example, residents of [facility] [place] eat very little red meat and average nine servings a day of antioxidant-rich fruits and vegetables. The Mediterranean diet has been associated with a lower level of oxidized low-density lipoprotein (LDL) cholesterol  the \"bad\" cholesterol that's more likely to build up deposits in your arteries. Nuts are another part of a healthy Mediterranean diet. Nuts are high in fat (approximately 80 percent of their calories come from fat), but most of the fat is not saturated. Because nuts are high in calories, they should not be eaten in large amounts  generally no more than a handful a day. For the best nutrition, avoid candied or honey-roasted and heavily salted nuts. Grains in the [address] [place] [address] region are typically whole grain and usually contain very few unhealthy trans fats, and bread is an important part of the diet there. However, throughout the [address] [place] [address] region, bread is eaten plain or dipped in olive oil  not eaten with butter or margarines, which contain saturated or trans fats.

## 2020-05-20 ENCOUNTER — VIRTUAL VISIT (OUTPATIENT)
Dept: INTERNAL MEDICINE CLINIC | Age: 35
End: 2020-05-20

## 2020-05-20 DIAGNOSIS — F51.01 PRIMARY INSOMNIA: ICD-10-CM

## 2020-05-20 DIAGNOSIS — F41.9 ANXIETY: Primary | ICD-10-CM

## 2020-05-20 RX ORDER — MONTELUKAST SODIUM 10 MG/1
10 TABLET ORAL DAILY
Qty: 30 TAB | Refills: 1 | Status: SHIPPED | OUTPATIENT
Start: 2020-05-20 | End: 2021-10-07 | Stop reason: SDUPTHER

## 2020-05-20 RX ORDER — SERTRALINE HYDROCHLORIDE 25 MG/1
25 TABLET, FILM COATED ORAL DAILY
Qty: 30 TAB | Refills: 0 | Status: SHIPPED | OUTPATIENT
Start: 2020-05-20 | End: 2020-06-17 | Stop reason: DRUGHIGH

## 2020-05-20 NOTE — PROGRESS NOTES
Hellen Romero is a 30 y.o. male who was seen by synchronous (real-time) audio-video technology on 5/20/2020. Consent: Hellen Romero, who was seen by synchronous (real-time) audio-video technology, and/or his healthcare decision maker, is aware that this patient-initiated, Telehealth encounter on 5/20/2020 is a billable service, with coverage as determined by his insurance carrier. He is aware that he may receive a bill and has provided verbal consent to proceed: Yes. Assessment & Plan:   Diagnoses and all orders for this visit:    1. Anxiety    2. Primary insomnia    Other orders  -     montelukast (SINGULAIR) 10 mg tablet; Take 1 Tab by mouth daily. -     sertraline (ZOLOFT) 25 mg tablet; Take 1 Tab by mouth daily. Return in 2 weeks for anxiety depression        Subjective:   Hellen Romero is a 30 y.o. male who was seen for Anxiety  he is a 30y.o. year old male who presents for evaluation of   Anxiety and/or Depression  Patient is currently talking to a counselor due to recent increase in stress and anxiety. Patient states that due to the coronavirus he believes that his anxiety is ramped up. 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 Patient also states that during this time period of the last few weeks that he has had a lot of insomnia due to racing thoughts. States he is taking melatonin which has not helped. Ongoing symptoms include: racing thoughts, psychomotor agitation, feelings of losing control, difficulty concentrating depressed mood  Patient denies: suicidal ideation, homocidal ideation. Reported side effects from the treatment: none. Prior to Admission medications    Medication Sig Start Date End Date Taking? Authorizing Provider   montelukast (SINGULAIR) 10 mg tablet Take 1 Tab by mouth daily. 5/20/20  Yes Aleah Smith MD   sertraline (ZOLOFT) 25 mg tablet Take 1 Tab by mouth daily.  5/20/20 Yes Kinza Jackson MD   albuterol (PROVENTIL VENTOLIN) 2.5 mg /3 mL (0.083 %) nebu 3 mL by Nebulization route every four (4) hours as needed for Wheezing. 12/19/19  Yes Kinza Jackson MD   fluticasone propion-salmeterol (ADVAIR DISKUS) 100-50 mcg/dose diskus inhaler INHALE 1 PUFF BY MOUTH EVERY 12 HOURS 12/19/19  Yes Chacho Dillon MD   albuterol (VENTOLIN HFA) 90 mcg/actuation inhaler Take 2 Puffs by inhalation every four (4) hours as needed for Wheezing. 5/13/19  Yes Kinza Jackson MD     No Known Allergies    There are no active problems to display for this patient. Current Outpatient Medications   Medication Sig Dispense Refill    montelukast (SINGULAIR) 10 mg tablet Take 1 Tab by mouth daily. 30 Tab 1    sertraline (ZOLOFT) 25 mg tablet Take 1 Tab by mouth daily. 30 Tab 0    albuterol (PROVENTIL VENTOLIN) 2.5 mg /3 mL (0.083 %) nebu 3 mL by Nebulization route every four (4) hours as needed for Wheezing. 24 Each 0    fluticasone propion-salmeterol (ADVAIR DISKUS) 100-50 mcg/dose diskus inhaler INHALE 1 PUFF BY MOUTH EVERY 12 HOURS 1 Inhaler 6    albuterol (VENTOLIN HFA) 90 mcg/actuation inhaler Take 2 Puffs by inhalation every four (4) hours as needed for Wheezing. 1 Inhaler 2     No Known Allergies  Past Medical History:   Diagnosis Date    Asthma     Ill-defined condition     L knee     History reviewed. No pertinent surgical history. Review of Systems   All other systems reviewed and are negative. Objective:   Vital Signs: (As obtained by patient/caregiver at home)  There were no vitals taken for this visit.      [INSTRUCTIONS:  \"[x]\" Indicates a positive item  \"[]\" Indicates a negative item  -- DELETE ALL ITEMS NOT EXAMINED]    Constitutional: [x] Appears well-developed and well-nourished [x] No apparent distress      [] Abnormal -     Mental status: [x] Alert and awake  [x] Oriented to person/place/time [x] Able to follow commands    [] Abnormal -     Eyes:   EOM    [x]  Normal    [] Abnormal -   Sclera  [x]  Normal    [] Abnormal -          Discharge [x]  None visible   [] Abnormal -     HENT: [x] Normocephalic, atraumatic  [] Abnormal -   [x] Mouth/Throat: Mucous membranes are moist    External Ears [x] Normal  [] Abnormal -    Neck: [x] No visualized mass [] Abnormal -     Pulmonary/Chest: [x] Respiratory effort normal   [x] No visualized signs of difficulty breathing or respiratory distress        [] Abnormal -      Musculoskeletal:   [x] Normal gait with no signs of ataxia         [x] Normal range of motion of neck        [] Abnormal -     Neurological:        [x] No Facial Asymmetry (Cranial nerve 7 motor function) (limited exam due to video visit)          [x] No gaze palsy        [] Abnormal -          Skin:        [x] No significant exanthematous lesions or discoloration noted on facial skin         [] Abnormal -            Psychiatric:       [x] Normal Affect [] Abnormal -        [x] No Hallucinations    Other pertinent observable physical exam findings:-        We discussed the expected course, resolution and complications of the diagnosis(es) in detail. Medication risks, benefits, costs, interactions, and alternatives were discussed as indicated. I advised him to contact the office if his condition worsens, changes or fails to improve as anticipated. He expressed understanding with the diagnosis(es) and plan. Nadia Heard is a 30 y.o. male who was evaluated by a video visit encounter for concerns as above. Patient identification was verified prior to start of the visit. A caregiver was present when appropriate. Due to this being a TeleHealth encounter (During [place]-11 public health emergency), evaluation of the following organ systems was limited: Vitals/Constitutional/EENT/Resp/CV/GI//MS/Neuro/Skin/Heme-Lymph-Imm.   Pursuant to the emergency declaration under the [address] [facility], [address] and Response Supplemental Appropriations Act, this Virtual  Visit was conducted, with patient's (and/or legal guardian's) consent, to reduce the patient's risk of exposure to COVID-19 and provide necessary medical care. Services were provided through a video synchronous discussion virtually to substitute for in-person clinic visit. Patient and provider were located at their individual homes. Sergey Whalen MD      Please note that this dictation was completed with [employer], the computer voice recognition software. Quite often unanticipated grammatical, syntax, homophones, and other interpretive errors are inadvertently transcribed by the computer software. Please disregard these errors. Please excuse any errors that have escaped final proofreading. Thank you.

## 2020-05-20 NOTE — PATIENT INSTRUCTIONS

## 2020-06-03 ENCOUNTER — VIRTUAL VISIT (OUTPATIENT)
Dept: INTERNAL MEDICINE CLINIC | Age: 35
End: 2020-06-03

## 2020-06-03 DIAGNOSIS — F51.01 PRIMARY INSOMNIA: ICD-10-CM

## 2020-06-03 DIAGNOSIS — F41.9 ANXIETY: Primary | ICD-10-CM

## 2020-06-03 RX ORDER — SERTRALINE HYDROCHLORIDE 50 MG/1
50 TABLET, FILM COATED ORAL DAILY
Qty: 30 TAB | Refills: 1 | Status: SHIPPED | OUTPATIENT
Start: 2020-06-03 | End: 2020-06-17 | Stop reason: DRUGHIGH

## 2020-06-03 NOTE — PROGRESS NOTES
Cuate Connolly is a 37 y.o. male who was seen by synchronous (real-time) audio-video technology on 6/3/2020. Consent: Cuate Connolly, who was seen by synchronous (real-time) audio-video technology, and/or his healthcare decision maker, is aware that this patient-initiated, Telehealth encounter on 6/3/2020 is a billable service, with coverage as determined by his insurance carrier. He is aware that he may receive a bill and has provided verbal consent to proceed: Yes. Assessment & Plan:   Diagnoses and all orders for this visit:    1. Anxiety    2. Primary insomnia    Other orders  -     sertraline (ZOLOFT) 50 mg tablet; Take 1 Tab by mouth daily. Return clinic virtually in 2 weeks for recheck. If erectile dysfunction becomes an issue we will consider Wellbutrin    Subjective:   Cuate Connolly is a 37 y.o. male who was seen for anxiety depression. Patient states that he is been taking Zoloft 25 mg with slight improvement in nighttime sleep although he still waking about 1 time a night. This is much better than the 3-4 times he was waking up previously. He does notice that 1 time he had elongated duration until ejaculation but no ED symptoms. He states that he is willing to continue to try this medication and see if this becomes a continual problem. Prior to Admission medications    Medication Sig Start Date End Date Taking? Authorizing Provider   sertraline (ZOLOFT) 50 mg tablet Take 1 Tab by mouth daily. 6/3/20  Yes Riddhi Pfeiffer MD   montelukast (SINGULAIR) 10 mg tablet Take 1 Tab by mouth daily. 5/20/20   Riddhi Pfeiffer MD   sertraline (ZOLOFT) 25 mg tablet Take 1 Tab by mouth daily. 5/20/20   Riddhi Pfeiffer MD   albuterol (PROVENTIL VENTOLIN) 2.5 mg /3 mL (0.083 %) nebu 3 mL by Nebulization route every four (4) hours as needed for Wheezing.  12/19/19   Riddhi Pfeiffer MD   fluticasone propion-salmeterol (ADVAIR DISKUS) 100-50 mcg/dose diskus inhaler INHALE 1 PUFF BY MOUTH EVERY 12 HOURS 12/19/19   Patrice Tovar MD   albuterol (VENTOLIN HFA) 90 mcg/actuation inhaler Take 2 Puffs by inhalation every four (4) hours as needed for Wheezing. 5/13/19   Patrice Tovar MD     No Known Allergies    There are no active problems to display for this patient. Current Outpatient Medications   Medication Sig Dispense Refill    sertraline (ZOLOFT) 50 mg tablet Take 1 Tab by mouth daily. 30 Tab 1    montelukast (SINGULAIR) 10 mg tablet Take 1 Tab by mouth daily. 30 Tab 1    sertraline (ZOLOFT) 25 mg tablet Take 1 Tab by mouth daily. 30 Tab 0    albuterol (PROVENTIL VENTOLIN) 2.5 mg /3 mL (0.083 %) nebu 3 mL by Nebulization route every four (4) hours as needed for Wheezing. 24 Each 0    fluticasone propion-salmeterol (ADVAIR DISKUS) 100-50 mcg/dose diskus inhaler INHALE 1 PUFF BY MOUTH EVERY 12 HOURS 1 Inhaler 6    albuterol (VENTOLIN HFA) 90 mcg/actuation inhaler Take 2 Puffs by inhalation every four (4) hours as needed for Wheezing. 1 Inhaler 2     No Known Allergies  Past Medical History:   Diagnosis Date    Asthma     Ill-defined condition     L knee     No past surgical history on file. Review of Systems   All other systems reviewed and are negative. Objective:   Vital Signs: (As obtained by patient/caregiver at home)  There were no vitals taken for this visit.      [INSTRUCTIONS:  \"[x]\" Indicates a positive item  \"[]\" Indicates a negative item  -- DELETE ALL ITEMS NOT EXAMINED]    Constitutional: [x] Appears well-developed and well-nourished [x] No apparent distress      [] Abnormal -     Mental status: [x] Alert and awake  [x] Oriented to person/place/time [x] Able to follow commands    [] Abnormal -     Eyes:   EOM    [x]  Normal    [] Abnormal -   Sclera  [x]  Normal    [] Abnormal -          Discharge [x]  None visible   [] Abnormal -     HENT: [x] Normocephalic, atraumatic  [] Abnormal -   [x] Mouth/Throat: Mucous membranes are moist    External Ears [x] Normal  [] Abnormal -    Neck: [x] No visualized mass [] Abnormal -     Pulmonary/Chest: [x] Respiratory effort normal   [x] No visualized signs of difficulty breathing or respiratory distress        [] Abnormal -      Musculoskeletal:   [x] Normal gait with no signs of ataxia         [x] Normal range of motion of neck        [] Abnormal -     Neurological:        [x] No Facial Asymmetry (Cranial nerve 7 motor function) (limited exam due to video visit)          [x] No gaze palsy        [] Abnormal -          Skin:        [x] No significant exanthematous lesions or discoloration noted on facial skin         [] Abnormal -            Psychiatric:       [x] Normal Affect [] Abnormal -        [x] No Hallucinations    Other pertinent observable physical exam findings:-        We discussed the expected course, resolution and complications of the diagnosis(es) in detail. Medication risks, benefits, costs, interactions, and alternatives were discussed as indicated. I advised him to contact the office if his condition worsens, changes or fails to improve as anticipated. He expressed understanding with the diagnosis(es) and plan. Nadia Heard is a 37 y.o. male who was evaluated by a video visit encounter for concerns as above. Patient identification was verified prior to start of the visit. A caregiver was present when appropriate. Due to this being a TeleHealth encounter (During ROFYW-50 public health emergency), evaluation of the following organ systems was limited: Vitals/Constitutional/EENT/Resp/CV/GI//MS/Neuro/Skin/Heme-Lymph-Imm. Pursuant to the emergency declaration under the [facility] waiver authority and the [employer] General Act, this Virtual  Visit was conducted, with patient's (and/or legal guardian's) consent, to reduce the patient's risk of exposure to COVID-19 and provide necessary medical care.      Services were provided through a video synchronous discussion virtually to substitute for in-person clinic visit. Patient and provider were located at their individual homes.       Susan Zhang MD

## 2020-06-17 ENCOUNTER — VIRTUAL VISIT (OUTPATIENT)
Dept: INTERNAL MEDICINE CLINIC | Age: 35
End: 2020-06-17

## 2020-06-17 DIAGNOSIS — F41.9 ANXIETY: Primary | ICD-10-CM

## 2020-06-17 DIAGNOSIS — F51.01 PRIMARY INSOMNIA: ICD-10-CM

## 2020-06-17 RX ORDER — BUPROPION HYDROCHLORIDE 150 MG/1
TABLET ORAL
Qty: 60 TAB | Refills: 0 | Status: SHIPPED | OUTPATIENT
Start: 2020-06-17

## 2020-06-17 NOTE — PROGRESS NOTES
Stephanie Delgadillo is a 37 y.o. male who was seen by synchronous (real-time) audio-video technology on 6/17/2020. Consent: Stephanie Delgadillo, who was seen by synchronous (real-time) audio-video technology, and/or his healthcare decision maker, is aware that this patient-initiated, Telehealth encounter on 6/17/2020 is a billable service, with coverage as determined by his insurance carrier. He is aware that he may receive a bill and has provided verbal consent to proceed: Yes. Assessment & Plan:   Diagnoses and all orders for this visit:    1. Anxiety    2. Primary insomnia    Other orders  -     buPROPion XL (WELLBUTRIN XL) 150 mg tablet; Take 1 tab daily for week 1, then increase to 2 tabs daily            Subjective:   Stephanie Delgadillo is a 37 y.o. male who was seen for Anxiety  Anxiety/Depression Review:  Patient states that he bumped up his Zoloft to 50 mg he felt like he was \"drunk\". So that he stopped it cold turkey. He did have some residual symptoms where he was not feeling just quite right for a few days. States that now he is back to normal but does not want to continue with Zoloft as he did also have erectile dysfunction. He is open to starting any medication. States that he still suffers from insomnia which he hopes that many medication will help. Patient denies suicidal homicidal ideations  Prior to Admission medications    Medication Sig Start Date End Date Taking? Authorizing Provider   sertraline (ZOLOFT) 50 mg tablet Take 1 Tab by mouth daily. 6/3/20   Brett Martin MD   montelukast (SINGULAIR) 10 mg tablet Take 1 Tab by mouth daily. 5/20/20   Brett Martin MD   sertraline (ZOLOFT) 25 mg tablet Take 1 Tab by mouth daily. 5/20/20   Brett Martin MD   albuterol (PROVENTIL VENTOLIN) 2.5 mg /3 mL (0.083 %) nebu 3 mL by Nebulization route every four (4) hours as needed for Wheezing.  12/19/19   Brett Martin MD   fluticasone propion-salmeterol (ADVAIR DISKUS) 100-50 mcg/dose diskus inhaler INHALE 1 PUFF BY MOUTH EVERY 12 HOURS 12/19/19   Pee James MD   albuterol (VENTOLIN HFA) 90 mcg/actuation inhaler Take 2 Puffs by inhalation every four (4) hours as needed for Wheezing. 5/13/19   Pee James MD     No Known Allergies    Current Outpatient Medications   Medication Sig Dispense Refill    montelukast (SINGULAIR) 10 mg tablet Take 1 Tab by mouth daily. 30 Tab 1    albuterol (PROVENTIL VENTOLIN) 2.5 mg /3 mL (0.083 %) nebu 3 mL by Nebulization route every four (4) hours as needed for Wheezing. 24 Each 0    fluticasone propion-salmeterol (ADVAIR DISKUS) 100-50 mcg/dose diskus inhaler INHALE 1 PUFF BY MOUTH EVERY 12 HOURS 1 Inhaler 6    albuterol (VENTOLIN HFA) 90 mcg/actuation inhaler Take 2 Puffs by inhalation every four (4) hours as needed for Wheezing. 1 Inhaler 2     No Known Allergies  Past Medical History:   Diagnosis Date    Asthma     Ill-defined condition     L knee     No past surgical history on file. No family history on file. Review of Systems   All other systems reviewed and are negative. Objective:   Vital Signs: (As obtained by patient/caregiver at home)  There were no vitals taken for this visit.      [INSTRUCTIONS:  \"[x]\" Indicates a positive item  \"[]\" Indicates a negative item  -- DELETE ALL ITEMS NOT EXAMINED]    Constitutional: [x] Appears well-developed and well-nourished [x] No apparent distress      [] Abnormal -     Mental status: [x] Alert and awake  [x] Oriented to person/place/time [x] Able to follow commands    [] Abnormal -     Eyes:   EOM    [x]  Normal    [] Abnormal -   Sclera  [x]  Normal    [] Abnormal -          Discharge [x]  None visible   [] Abnormal -     HENT: [x] Normocephalic, atraumatic  [] Abnormal -   [x] Mouth/Throat: Mucous membranes are moist    External Ears [x] Normal  [] Abnormal -    Neck: [x] No visualized mass [] Abnormal -     Pulmonary/Chest: [x] Respiratory effort normal   [x] No visualized signs of difficulty breathing or respiratory distress        [] Abnormal -      Musculoskeletal:   [x] Normal gait with no signs of ataxia         [x] Normal range of motion of neck        [] Abnormal -     Neurological:        [x] No Facial Asymmetry (Cranial nerve 7 motor function) (limited exam due to video visit)          [x] No gaze palsy        [] Abnormal -          Skin:        [x] No significant exanthematous lesions or discoloration noted on facial skin         [] Abnormal -            Psychiatric:       [x] Normal Affect [] Abnormal -        [x] No Hallucinations    Other pertinent observable physical exam findings:-        We discussed the expected course, resolution and complications of the diagnosis(es) in detail. Medication risks, benefits, costs, interactions, and alternatives were discussed as indicated. I advised him to contact the office if his condition worsens, changes or fails to improve as anticipated. He expressed understanding with the diagnosis(es) and plan. Alexandria Roque is a 37 y.o. male who was evaluated by a video visit encounter for concerns as above. Patient identification was verified prior to start of the visit. A caregiver was present when appropriate. Due to this being a TeleHealth encounter (During [place]-05 public health emergency), evaluation of the following organ systems was limited: Vitals/Constitutional/EENT/Resp/CV/GI//MS/Neuro/Skin/Heme-Lymph-Imm. Pursuant to the emergency declaration under the [facility] waiver authority and the [employer] General Act, this Virtual  Visit was conducted, with patient's (and/or legal guardian's) consent, to reduce the patient's risk of exposure to COVID-19 and provide necessary medical care. Services were provided through a video synchronous discussion virtually to substitute for in-person clinic visit.    Patient and provider were located at their individual homes.       Loralie Seip, MD

## 2020-07-01 ENCOUNTER — VIRTUAL VISIT (OUTPATIENT)
Dept: INTERNAL MEDICINE CLINIC | Age: 35
End: 2020-07-01

## 2020-07-01 DIAGNOSIS — F51.01 PRIMARY INSOMNIA: ICD-10-CM

## 2020-07-01 DIAGNOSIS — F41.9 ANXIETY: Primary | ICD-10-CM

## 2020-07-01 NOTE — PROGRESS NOTES
Annamaria Crespo is a 37 y.o. male who was seen by synchronous (real-time) audio-video technology on 2020 for No chief complaint on file. Assessment & Plan:   Diagnoses and all orders for this visit:    1. Anxiety  -     REFERRAL TO BEHAVIORAL HEALTH    2. Primary insomnia  -     REFERRAL TO BEHAVIORAL HEALTH      CBT therapy would be very beneficial for him. Patient may call back to start medications for both sleep and/or anxiety if worsening symptoms. We will follow-up in 1 month    Subjective:   Patient is a 79-year-old male following up on anxiety. Patient states that he did not take Wellbutrin as he was hesitant to. Patient states that he has been working on spiritual care, listening to music and meditation to help which is been working. He is starting work in about 2 weeks and believes that his anxiety may increase at that point. He may reconsider starting medication but would like to hold off for now. Denies any suicidal homicidal ideations. Patient also states that his insomnia is still present but not every day. Patient states he does have a  which makes it hard to quantify his sleep. Prior to Admission medications    Medication Sig Start Date End Date Taking? Authorizing Provider   buPROPion XL (WELLBUTRIN XL) 150 mg tablet Take 1 tab daily for week 1, then increase to 2 tabs daily 20   Yobany Paredes MD   montelukast (SINGULAIR) 10 mg tablet Take 1 Tab by mouth daily. 20   Yobany Paredes MD   albuterol (PROVENTIL VENTOLIN) 2.5 mg /3 mL (0.083 %) nebu 3 mL by Nebulization route every four (4) hours as needed for Wheezing. 19   Yobany Paredes MD   fluticasone propion-salmeterol (ADVAIR DISKUS) 100-50 mcg/dose diskus inhaler INHALE 1 PUFF BY MOUTH EVERY 12 HOURS 19   Yobany Paredes MD   albuterol (VENTOLIN HFA) 90 mcg/actuation inhaler Take 2 Puffs by inhalation every four (4) hours as needed for Wheezing.  19   Yobany Paredes MD     Current Outpatient Medications   Medication Sig Dispense Refill    buPROPion XL (WELLBUTRIN XL) 150 mg tablet Take 1 tab daily for week 1, then increase to 2 tabs daily 60 Tab 0    montelukast (SINGULAIR) 10 mg tablet Take 1 Tab by mouth daily. 30 Tab 1    albuterol (PROVENTIL VENTOLIN) 2.5 mg /3 mL (0.083 %) nebu 3 mL by Nebulization route every four (4) hours as needed for Wheezing. 24 Each 0    fluticasone propion-salmeterol (ADVAIR DISKUS) 100-50 mcg/dose diskus inhaler INHALE 1 PUFF BY MOUTH EVERY 12 HOURS 1 Inhaler 6    albuterol (VENTOLIN HFA) 90 mcg/actuation inhaler Take 2 Puffs by inhalation every four (4) hours as needed for Wheezing. 1 Inhaler 2     No Known Allergies  Past Medical History:   Diagnosis Date    Asthma     Ill-defined condition     L knee     No past surgical history on file. No family history on file. Review of Systems   All other systems reviewed and are negative. Objective:   No flowsheet data found.      [INSTRUCTIONS:  \"[x]\" Indicates a positive item  \"[]\" Indicates a negative item  -- DELETE ALL ITEMS NOT EXAMINED]    Constitutional: [x] Appears well-developed and well-nourished [x] No apparent distress      [] Abnormal -     Mental status: [x] Alert and awake  [x] Oriented to person/place/time [x] Able to follow commands    [] Abnormal -     Eyes:   EOM    [x]  Normal    [] Abnormal -   Sclera  [x]  Normal    [] Abnormal -          Discharge [x]  None visible   [] Abnormal -     HENT: [x] Normocephalic, atraumatic  [] Abnormal -   [x] Mouth/Throat: Mucous membranes are moist    External Ears [x] Normal  [] Abnormal -    Neck: [x] No visualized mass [] Abnormal -     Pulmonary/Chest: [x] Respiratory effort normal   [x] No visualized signs of difficulty breathing or respiratory distress        [] Abnormal -      Musculoskeletal:   [x] Normal gait with no signs of ataxia         [x] Normal range of motion of neck        [] Abnormal -     Neurological:        [x] No Facial Asymmetry (Cranial nerve 7 motor function) (limited exam due to video visit)          [x] No gaze palsy        [] Abnormal -          Skin:        [x] No significant exanthematous lesions or discoloration noted on facial skin         [] Abnormal -            Psychiatric:       [x] Normal Affect [] Abnormal -        [x] No Hallucinations    Other pertinent observable physical exam findings:-        We discussed the expected course, resolution and complications of the diagnosis(es) in detail. Medication risks, benefits, costs, interactions, and alternatives were discussed as indicated. I advised him to contact the office if his condition worsens, changes or fails to improve as anticipated. He expressed understanding with the diagnosis(es) and plan. Hematnh Wong, who was evaluated through a patient-initiated, synchronous (real-time) audio-video encounter, and/or his healthcare decision maker, is aware that it is a billable service, with coverage as determined by his insurance carrier. He provided verbal consent to proceed: Yes, and patient identification was verified. It was conducted pursuant to the emergency declaration under the [address] [place] [address], [address] [place] [address] authority and the [employer] and [employer] General Act. A caregiver was present when appropriate. Ability to conduct physical exam was limited. I was at home. The patient was at home.       Samaria Jesus MD

## 2020-08-03 ENCOUNTER — VIRTUAL VISIT (OUTPATIENT)
Dept: INTERNAL MEDICINE CLINIC | Age: 35
End: 2020-08-03
Payer: COMMERCIAL

## 2020-08-03 DIAGNOSIS — F51.01 PRIMARY INSOMNIA: ICD-10-CM

## 2020-08-03 DIAGNOSIS — F41.9 ANXIETY: Primary | ICD-10-CM

## 2020-08-03 DIAGNOSIS — E78.00 ELEVATED CHOLESTEROL: ICD-10-CM

## 2020-08-03 PROCEDURE — 99214 OFFICE O/P EST MOD 30 MIN: CPT | Performed by: FAMILY MEDICINE

## 2020-08-03 NOTE — PROGRESS NOTES
Lala Mijares is a 37 y.o. male who was seen by synchronous (real-time) audio-video technology on 8/3/2020 for Anxiety        Assessment & Plan:   Diagnoses and all orders for this visit:    1. Anxiety  -     REFERRAL TO BEHAVIORAL HEALTH    2. Primary insomnia  -     REFERRAL TO BEHAVIORAL HEALTH    3. Elevated cholesterol  -     LIPID PANEL      Patient will follow-up as needed for anxiety      Subjective:   Patient is a 62-year-old male following up for anxiety and insomnia. Patient has been continuously taking his melatonin which is help with sleep. He does have a 9week-old infant which also disturbs sleep. Patient states that he is handling his anxiety well and is back at work after paternity leave. He denies any suicidal homicidal ideations. Denies any panic attacks and states that he is been able to handle the change well. Prior to Admission medications    Medication Sig Start Date End Date Taking? Authorizing Provider   buPROPion XL (WELLBUTRIN XL) 150 mg tablet Take 1 tab daily for week 1, then increase to 2 tabs daily 6/17/20   Keily Carrillo MD   montelukast (SINGULAIR) 10 mg tablet Take 1 Tab by mouth daily. 5/20/20   Keily Carrillo MD   albuterol (PROVENTIL VENTOLIN) 2.5 mg /3 mL (0.083 %) nebu 3 mL by Nebulization route every four (4) hours as needed for Wheezing. 12/19/19   Keily Carrillo MD   fluticasone propion-salmeterol (ADVAIR DISKUS) 100-50 mcg/dose diskus inhaler INHALE 1 PUFF BY MOUTH EVERY 12 HOURS 12/19/19   Keily Carrillo MD   albuterol (VENTOLIN HFA) 90 mcg/actuation inhaler Take 2 Puffs by inhalation every four (4) hours as needed for Wheezing. 5/13/19   Keily Carrillo MD     There are no active problems to display for this patient. Current Outpatient Medications   Medication Sig Dispense Refill    buPROPion XL (WELLBUTRIN XL) 150 mg tablet Take 1 tab daily for week 1, then increase to 2 tabs daily 60 Tab 0    montelukast (SINGULAIR) 10 mg tablet Take 1 Tab by mouth daily. 30 Tab 1    albuterol (PROVENTIL VENTOLIN) 2.5 mg /3 mL (0.083 %) nebu 3 mL by Nebulization route every four (4) hours as needed for Wheezing. 24 Each 0    fluticasone propion-salmeterol (ADVAIR DISKUS) 100-50 mcg/dose diskus inhaler INHALE 1 PUFF BY MOUTH EVERY 12 HOURS 1 Inhaler 6    albuterol (VENTOLIN HFA) 90 mcg/actuation inhaler Take 2 Puffs by inhalation every four (4) hours as needed for Wheezing. 1 Inhaler 2     No Known Allergies  Past Medical History:   Diagnosis Date    Asthma     Ill-defined condition     L knee     No past surgical history on file. No family history on file. Review of Systems   All other systems reviewed and are negative. Objective:   No flowsheet data found.      [INSTRUCTIONS:  \"[x]\" Indicates a positive item  \"[]\" Indicates a negative item  -- DELETE ALL ITEMS NOT EXAMINED]    Constitutional: [x] Appears well-developed and well-nourished [x] No apparent distress      [] Abnormal -     Mental status: [x] Alert and awake  [x] Oriented to person/place/time [x] Able to follow commands    [] Abnormal -     Eyes:   EOM    [x]  Normal    [] Abnormal -   Sclera  [x]  Normal    [] Abnormal -          Discharge [x]  None visible   [] Abnormal -     HENT: [x] Normocephalic, atraumatic  [] Abnormal -   [x] Mouth/Throat: Mucous membranes are moist    External Ears [x] Normal  [] Abnormal -    Neck: [x] No visualized mass [] Abnormal -     Pulmonary/Chest: [x] Respiratory effort normal   [x] No visualized signs of difficulty breathing or respiratory distress        [] Abnormal -      Musculoskeletal:   [x] Normal gait with no signs of ataxia         [x] Normal range of motion of neck        [] Abnormal -     Neurological:        [x] No Facial Asymmetry (Cranial nerve 7 motor function) (limited exam due to video visit)          [x] No gaze palsy        [] Abnormal -          Skin:        [x] No significant exanthematous lesions or discoloration noted on facial skin         [] Abnormal -            Psychiatric:       [x] Normal Affect [] Abnormal -        [x] No Hallucinations    Other pertinent observable physical exam findings:-        We discussed the expected course, resolution and complications of the diagnosis(es) in detail. Medication risks, benefits, costs, interactions, and alternatives were discussed as indicated. I advised him to contact the office if his condition worsens, changes or fails to improve as anticipated. He expressed understanding with the diagnosis(es) and plan. Claudean Meuse, who was evaluated through a patient-initiated, synchronous (real-time) audio-video encounter, and/or his healthcare decision maker, is aware that it is a billable service, with coverage as determined by his insurance carrier. He provided verbal consent to proceed: Yes, and patient identification was verified. It was conducted pursuant to the emergency declaration under the [address] [place] [address], [address] [place] [address] authority and the [employer] General Act. A caregiver was present when appropriate. Ability to conduct physical exam was limited. I was at home. The patient was at home.       Gianfranco Cuevas MD

## 2020-11-03 ENCOUNTER — VIRTUAL VISIT (OUTPATIENT)
Dept: INTERNAL MEDICINE CLINIC | Age: 35
End: 2020-11-03
Payer: COMMERCIAL

## 2020-11-03 DIAGNOSIS — F51.01 PRIMARY INSOMNIA: ICD-10-CM

## 2020-11-03 DIAGNOSIS — F41.9 ANXIETY: Primary | ICD-10-CM

## 2020-11-03 PROCEDURE — 99214 OFFICE O/P EST MOD 30 MIN: CPT | Performed by: FAMILY MEDICINE

## 2020-11-03 RX ORDER — TRAZODONE HYDROCHLORIDE 50 MG/1
50 TABLET ORAL
Qty: 30 TAB | Refills: 0 | Status: CANCELLED | OUTPATIENT
Start: 2020-11-03

## 2020-11-03 NOTE — PROGRESS NOTES
Leslee Lanes is a 37 y.o. male who was seen by synchronous (real-time) audio-video technology on 11/3/2020 for Anxiety        Assessment & Plan:   Diagnoses and all orders for this visit:    1. Anxiety    2. Primary insomnia    Other orders  -     traZODone (DESYREL) 50 mg tablet; Take 1 Tab by mouth nightly.  -     REFERRAL TO BEHAVIORAL HEALTH    We will start a trial of trazodone 50 mg for insomnia along with melatonin  Patient will be given referral to behavioral health again since he now has changed his work schedule      Subjective: Anxiety/Depression Review:  Patient is seen for anxiety disorder. Current treatment includes   Ongoing symptoms include: palpitations. Patient denies: suicidal ideation, homocidal ideation. Reported side effects from the treatment: none. Patient was unable to schedule with behavioral health due to working during normal business hours. States he is still interested in the room medicine  Patient also states that he has been taking Benadryl and melatonin which worked somewhat for insomnia not always. Prior to Admission medications    Medication Sig Start Date End Date Taking? Authorizing Provider   buPROPion XL (WELLBUTRIN XL) 150 mg tablet Take 1 tab daily for week 1, then increase to 2 tabs daily 6/17/20   Nano Walker MD   montelukast (SINGULAIR) 10 mg tablet Take 1 Tab by mouth daily. 5/20/20   Nano Walker MD   albuterol (PROVENTIL VENTOLIN) 2.5 mg /3 mL (0.083 %) nebu 3 mL by Nebulization route every four (4) hours as needed for Wheezing. 12/19/19   Nano Walker MD   fluticasone propion-salmeterol (ADVAIR DISKUS) 100-50 mcg/dose diskus inhaler INHALE 1 PUFF BY MOUTH EVERY 12 HOURS 12/19/19   Nano Walker MD   albuterol (VENTOLIN HFA) 90 mcg/actuation inhaler Take 2 Puffs by inhalation every four (4) hours as needed for Wheezing. 5/13/19   Nano Walker MD     There are no active problems to display for this patient.     Current Outpatient Medications Medication Sig Dispense Refill    buPROPion XL (WELLBUTRIN XL) 150 mg tablet Take 1 tab daily for week 1, then increase to 2 tabs daily 60 Tab 0    montelukast (SINGULAIR) 10 mg tablet Take 1 Tab by mouth daily. 30 Tab 1    albuterol (PROVENTIL VENTOLIN) 2.5 mg /3 mL (0.083 %) nebu 3 mL by Nebulization route every four (4) hours as needed for Wheezing. 24 Each 0    fluticasone propion-salmeterol (ADVAIR DISKUS) 100-50 mcg/dose diskus inhaler INHALE 1 PUFF BY MOUTH EVERY 12 HOURS 1 Inhaler 6    albuterol (VENTOLIN HFA) 90 mcg/actuation inhaler Take 2 Puffs by inhalation every four (4) hours as needed for Wheezing. 1 Inhaler 2     No Known Allergies  Past Medical History:   Diagnosis Date    Asthma     Ill-defined condition     L knee     No past surgical history on file. No family history on file. Pertinent positive and negative systems noted in HPI, remainder of systems negative      Objective:   No flowsheet data found.      [INSTRUCTIONS:  \"[x]\" Indicates a positive item  \"[]\" Indicates a negative item  -- DELETE ALL ITEMS NOT EXAMINED]    Constitutional: [x] Appears well-developed and well-nourished [x] No apparent distress      [] Abnormal -     Mental status: [x] Alert and awake  [x] Oriented to person/place/time [x] Able to follow commands    [] Abnormal -     Eyes:   EOM    [x]  Normal    [] Abnormal -   Sclera  [x]  Normal    [] Abnormal -          Discharge [x]  None visible   [] Abnormal -     HENT: [x] Normocephalic, atraumatic  [] Abnormal -   [x] Mouth/Throat: Mucous membranes are moist    External Ears [x] Normal  [] Abnormal -    Neck: [x] No visualized mass [] Abnormal -     Pulmonary/Chest: [x] Respiratory effort normal   [x] No visualized signs of difficulty breathing or respiratory distress        [] Abnormal -      Musculoskeletal:   [x] Normal gait with no signs of ataxia         [x] Normal range of motion of neck        [] Abnormal -     Neurological:        [x] No Facial Asymmetry (Cranial nerve 7 motor function) (limited exam due to video visit)          [x] No gaze palsy        [] Abnormal -          Skin:        [x] No significant exanthematous lesions or discoloration noted on facial skin         [] Abnormal -            Psychiatric:       [x] Normal Affect [] Abnormal -        [x] No Hallucinations    Other pertinent observable physical exam findings:-        We discussed the expected course, resolution and complications of the diagnosis(es) in detail. Medication risks, benefits, costs, interactions, and alternatives were discussed as indicated. I advised him to contact the office if his condition worsens, changes or fails to improve as anticipated. He expressed understanding with the diagnosis(es) and plan. Yuri Schmitz, who was evaluated through a patient-initiated, synchronous (real-time) audio-video encounter, and/or his healthcare decision maker, is aware that it is a billable service, with coverage as determined by his insurance carrier. He provided verbal consent to proceed: Yes, and patient identification was verified. It was conducted pursuant to the emergency declaration under the [address] [place] [address] authority and the [employer] General Act. A caregiver was present when appropriate. Ability to conduct physical exam was limited. I was at home. The patient was at home.       Lauren Ricardo MD

## 2021-02-24 ENCOUNTER — OFFICE VISIT (OUTPATIENT)
Dept: INTERNAL MEDICINE CLINIC | Age: 36
End: 2021-02-24
Payer: COMMERCIAL

## 2021-02-24 VITALS
SYSTOLIC BLOOD PRESSURE: 113 MMHG | BODY MASS INDEX: 34.59 KG/M2 | TEMPERATURE: 98 F | OXYGEN SATURATION: 97 % | HEART RATE: 60 BPM | WEIGHT: 241.6 LBS | RESPIRATION RATE: 16 BRPM | HEIGHT: 70 IN | DIASTOLIC BLOOD PRESSURE: 72 MMHG

## 2021-02-24 DIAGNOSIS — Z00.00 VISIT FOR WELL MAN HEALTH CHECK: Primary | ICD-10-CM

## 2021-02-24 DIAGNOSIS — F51.01 PRIMARY INSOMNIA: ICD-10-CM

## 2021-02-24 PROCEDURE — 99395 PREV VISIT EST AGE 18-39: CPT | Performed by: FAMILY MEDICINE

## 2021-02-24 RX ORDER — TRAZODONE HYDROCHLORIDE 50 MG/1
50 TABLET ORAL
Qty: 30 TAB | Refills: 1 | Status: SHIPPED | OUTPATIENT
Start: 2021-02-24 | End: 2021-03-16 | Stop reason: SDUPTHER

## 2021-02-24 NOTE — PROGRESS NOTES
Chief Complaint   Patient presents with    Complete Physical     Patient is here for a wellness visit. he is a 37y.o. year old male who presents for CPE. Complete Physical Exam Questions:    1. Do you follow a low fat diet?  no  2. Are you up to date on your Tdap (<10 years)? No  3. Have you ever had a Pneumovax vaccine (>65)? Not applicable   URU06 Not applicable   DEZR48 Not applicable  4. Have you had Zoster vaccine (>60)? Not applicable  5. Have you had the HPV - Gardasil (13- 26)? Unknown  6. Do you follow an exercise program?  no  7. Do you smoke?  no If > 65 and smoker, have you had a abdominal aortic aneurysm ultrasound screen? No  8. Do you consider yourself overweight?  yes  9. Is there a family history of CAD< age 48? Unknown  10. Is there a family history of Cancer? No  11. Do you know your Cancer risks? No  12. Have you had a colonoscopy? Not applicable  13. Have you been tested for HIV or other STI's? No HIV today(18-66 y/o)? No   14. Have you had an EKG in the last five years(>50)? No  15. Have you had a PSA test done this year (50-69)? No    Other complaints: Sleeping medication     Reviewed and agree with Nurse Note and duplicated in this note. Reviewed PmHx, RxHx, FmHx, SocHx, AllgHx and updated and dated in the chart. No family history on file.     Past Medical History:   Diagnosis Date    Asthma     Ill-defined condition     L knee      Social History     Socioeconomic History    Marital status:      Spouse name: Not on file    Number of children: 2    Years of education: Not on file    Highest education level: Not on file   Occupational History    Occupation: nurse     Comment: [employer]   Tobacco Use    Smoking status: Never Smoker    Smokeless tobacco: Never Used   Substance and Sexual Activity    Alcohol use: Yes     Comment: socially    Drug use: No   Social History Narrative    Sikhism kai, Attends Speaking [employer] Review of Systems - negative except as listed above      Objective:     Vitals:    02/24/21 1454   BP: 113/72   Pulse: 60   Resp: 16   Temp: 98 °F (36.7 °C)   SpO2: 97%   Weight: 241 lb 9.6 oz (109.6 kg)   Height: 5' 10\" (1.778 m)       Physical Examination: General appearance - alert, well appearing, and in no distress  Eyes - pupils equal and reactive, extraocular eye movements intact  Ears - bilateral TM's and external ear canals normal  Nose - normal and patent, no erythema, discharge or polyps  Mouth - mucous membranes moist, pharynx normal without lesions  Neck - supple, no significant adenopathy  Chest - clear to auscultation, no wheezes, rales or rhonchi, symmetric air entry  Heart - normal rate, regular rhythm, normal S1, S2, no murmurs, rubs, clicks or gallops  Abdomen - soft, nontender, nondistended, no masses or organomegaly  Neurological - alert, oriented, normal speech, no focal findings or movement disorder noted  Musculoskeletal - no joint tenderness, deformity or swelling  Extremities - peripheral pulses normal, no pedal edema, no clubbing or cyanosis  Skin - normal coloration and turgor, no rashes, no suspicious skin lesions noted       Assessment/ Plan:   Diagnoses and all orders for this visit:    1. Visit for well man health check  -     CBC W/O DIFF; Future  -     LIPID PANEL; Future  -     METABOLIC PANEL, COMPREHENSIVE; Future    2. Primary insomnia    Other orders  -     traZODone (DESYREL) 50 mg tablet; Take 1 Tab by mouth nightly. Labs to be drawn: CBC, CMP, Lipid            I have discussed the diagnosis with the patient and the intended plan as seen in the above orders. The patient has received an after-visit summary and questions were answered concerning future plans.        Medication Side Effects and Warnings were discussed with patient,  Patient Labs were reviewed and or requested, and  Patient Past Records were reviewed and or requested  yes         Pt agrees to call or return to clinic and/or go to closest ER with any worsening of symptoms. This may include, but not limited to increased fever (>100.4) with NSAIDS or Tylenol, increased edema, confusion, rash, worsening of presenting symptoms. Please note that this dictation was completed with [employer], the computer voice recognition software. Quite often unanticipated grammatical, syntax, homophones, and other interpretive errors are inadvertently transcribed by the computer software. Please disregard these errors. Please excuse any errors that have escaped final proofreading. Thank you.

## 2021-02-25 LAB
ALBUMIN SERPL-MCNC: 4.1 G/DL (ref 3.5–5)
ALBUMIN/GLOB SERPL: 1.2 {RATIO} (ref 1.1–2.2)
ALP SERPL-CCNC: 86 U/L (ref 45–117)
ALT SERPL-CCNC: 38 U/L (ref 12–78)
ANION GAP SERPL CALC-SCNC: 6 MMOL/L (ref 5–15)
AST SERPL-CCNC: 20 U/L (ref 15–37)
BILIRUB SERPL-MCNC: 0.4 MG/DL (ref 0.2–1)
BUN SERPL-MCNC: 10 MG/DL (ref 6–20)
BUN/CREAT SERPL: 11 (ref 12–20)
CALCIUM SERPL-MCNC: 9.5 MG/DL (ref 8.5–10.1)
CHLORIDE SERPL-SCNC: 103 MMOL/L (ref 97–108)
CHOLEST SERPL-MCNC: 140 MG/DL
CO2 SERPL-SCNC: 29 MMOL/L (ref 21–32)
CREAT SERPL-MCNC: 0.88 MG/DL (ref 0.7–1.3)
ERYTHROCYTE [DISTWIDTH] IN BLOOD BY AUTOMATED COUNT: 13.2 % (ref 11.5–14.5)
GLOBULIN SER CALC-MCNC: 3.3 G/DL (ref 2–4)
GLUCOSE SERPL-MCNC: 86 MG/DL (ref 65–100)
HCT VFR BLD AUTO: 42.6 % (ref 36.6–50.3)
HDLC SERPL-MCNC: 45 MG/DL
HDLC SERPL: 3.1 {RATIO} (ref 0–5)
HGB BLD-MCNC: 14.3 G/DL (ref 12.1–17)
LDLC SERPL CALC-MCNC: 83.4 MG/DL (ref 0–100)
LIPID PROFILE,FLP: NORMAL
MCH RBC QN AUTO: 31.2 PG (ref 26–34)
MCHC RBC AUTO-ENTMCNC: 33.6 G/DL (ref 30–36.5)
MCV RBC AUTO: 92.8 FL (ref 80–99)
NRBC # BLD: 0 K/UL (ref 0–0.01)
NRBC BLD-RTO: 0 PER 100 WBC
PLATELET # BLD AUTO: 282 K/UL (ref 150–400)
PMV BLD AUTO: 11.6 FL (ref 8.9–12.9)
POTASSIUM SERPL-SCNC: 4.6 MMOL/L (ref 3.5–5.1)
PROT SERPL-MCNC: 7.4 G/DL (ref 6.4–8.2)
RBC # BLD AUTO: 4.59 M/UL (ref 4.1–5.7)
SODIUM SERPL-SCNC: 138 MMOL/L (ref 136–145)
TRIGL SERPL-MCNC: 58 MG/DL (ref ?–150)
VLDLC SERPL CALC-MCNC: 11.6 MG/DL
WBC # BLD AUTO: 7.7 K/UL (ref 4.1–11.1)

## 2021-03-16 ENCOUNTER — OFFICE VISIT (OUTPATIENT)
Dept: INTERNAL MEDICINE CLINIC | Age: 36
End: 2021-03-16
Payer: COMMERCIAL

## 2021-03-16 VITALS
HEIGHT: 70 IN | WEIGHT: 240.2 LBS | HEART RATE: 91 BPM | OXYGEN SATURATION: 97 % | SYSTOLIC BLOOD PRESSURE: 124 MMHG | BODY MASS INDEX: 34.39 KG/M2 | TEMPERATURE: 98.1 F | RESPIRATION RATE: 16 BRPM | DIASTOLIC BLOOD PRESSURE: 88 MMHG

## 2021-03-16 DIAGNOSIS — F41.9 ANXIETY: Primary | ICD-10-CM

## 2021-03-16 PROCEDURE — 99214 OFFICE O/P EST MOD 30 MIN: CPT | Performed by: FAMILY MEDICINE

## 2021-03-16 RX ORDER — TRAZODONE HYDROCHLORIDE 50 MG/1
50 TABLET ORAL
Qty: 30 TAB | Refills: 1 | Status: SHIPPED | OUTPATIENT
Start: 2021-03-16

## 2021-03-16 RX ORDER — HYDROXYZINE PAMOATE 50 MG/1
50 CAPSULE ORAL
Qty: 30 CAP | Refills: 0 | Status: SHIPPED | OUTPATIENT
Start: 2021-03-16 | End: 2021-04-15

## 2021-03-16 NOTE — PROGRESS NOTES
Chief Complaint   Patient presents with    Anxiety     Patient is here for anxiety. he is a 37y.o. year old male who presents for follow-up of   Anxiety and/or Depression  Well controlled on none and no other therapies. Ongoing symptoms include: none appetite loss, decreased sleep and poor concentration  Patient denies: palpitations, sweating, shortness of breath, dizziness, racing thoughts. Reported side effects from the treatment: none. Reviewed and agree with Nurse Note and duplicated in this note. Reviewed PmHx, RxHx, FmHx, SocHx, AllgHx and updated and dated in the chart. History reviewed. No pertinent family history.     Past Medical History:   Diagnosis Date    Asthma     Ill-defined condition     L knee      Social History     Socioeconomic History    Marital status:      Spouse name: Not on file    Number of children: 2    Years of education: Not on file    Highest education level: Not on file   Occupational History    Occupation: nurse     Comment: [employer]   Tobacco Use    Smoking status: Never Smoker    Smokeless tobacco: Never Used   Substance and Sexual Activity    Alcohol use: Yes     Comment: socially    Drug use: No   Social History Narrative    Gnosticism kai, Attends Speaking [employer]        Review of Systems - negative except as listed above      Objective:     Vitals:    03/16/21 1501   BP: 124/88   Pulse: 91   Resp: 16   Temp: 98.1 °F (36.7 °C)   SpO2: 97%   Weight: 240 lb 3.2 oz (109 kg)   Height: 5' 10\" (1.778 m)       Physical Examination: General appearance - alert, well appearing, and in no distress  Eyes - pupils equal and reactive, extraocular eye movements intact  Ears - bilateral TM's and external ear canals normal  Nose - normal and patent, no erythema, discharge or polyps  Mouth - mucous membranes moist, pharynx normal without lesions  Neck - supple, no significant adenopathy  Chest - clear to auscultation, no wheezes, rales or rhonchi, symmetric air entry  Heart - normal rate, regular rhythm, normal S1, S2, no murmurs, rubs, clicks or gallops  Abdomen - soft, nontender, nondistended, no masses or organomegaly  Back exam - full range of motion, no tenderness, palpable spasm or pain on motion  Neurological - alert, oriented, normal speech, no focal findings or movement disorder noted  Musculoskeletal - no joint tenderness, deformity or swelling  Extremities - peripheral pulses normal, no pedal edema, no clubbing or cyanosis  Skin - normal coloration and turgor, no rashes, no suspicious skin lesions noted    Assessment/ Plan:   Diagnoses and all orders for this visit:    1. Anxiety    Other orders  -     hydrOXYzine pamoate (VISTARIL) 50 mg capsule; Take 1 Cap by mouth three (3) times daily as needed for Anxiety or Agitation for up to 30 days. -     traZODone (DESYREL) 50 mg tablet; Take 1 Tab by mouth nightly. Follow-up in 2 weeks for virtual appointment  Continue with plan of counseling  I have discussed the diagnosis with the patient and the intended plan as seen in the above orders. The patient has received an after-visit summary and questions were answered concerning future plans. Medication Side Effects and Warnings were discussed with patient,  Patient Labs were reviewed and or requested, and  Patient Past Records were reviewed and or requested  yes         Pt agrees to call or return to clinic and/or go to closest ER with any worsening of symptoms. This may include, but not limited to increased fever (>100.4) with NSAIDS or Tylenol, increased edema, confusion, rash, worsening of presenting symptoms. Please note that this dictation was completed with [employer], the [employer] voice recognition software. Quite often unanticipated grammatical, syntax, homophones, and other interpretive errors are inadvertently transcribed by the computer software. Please disregard these errors.   Please excuse any errors that have escaped final proofreading. Thank you.

## 2021-03-16 NOTE — LETTER
NOTIFICATION RETURN TO WORK 
 
3/16/2021 3:32 PM 
 
Mr. Peri Marc [address] To Whom It May Concern: 
 
Peri Marc is currently under the care of Zahira Weir. He will return to work on: 03/18/21. If there are questions or concerns please have the patient contact our office. Sincerely, Fely Clarke MD

## 2021-03-16 NOTE — LETTER
NOTIFICATION RETURN TO WORK  
 
3/16/2021 3:34 PM 
 
Mr. Tanner Lee [address] To Whom It May Concern: 
 
Tanner Lee is currently under the care of Zahira Weir. He will return to work on: 03/19/21. If there are questions or concerns please have the patient contact our office. Sincerely, Yuki Slater MD

## 2021-10-07 RX ORDER — ALBUTEROL SULFATE 0.83 MG/ML
2.5 SOLUTION RESPIRATORY (INHALATION)
Qty: 24 EACH | Refills: 0 | Status: SHIPPED | OUTPATIENT
Start: 2021-10-07

## 2021-10-07 RX ORDER — ALBUTEROL SULFATE 90 UG/1
2 AEROSOL, METERED RESPIRATORY (INHALATION)
Qty: 1 EACH | Refills: 1 | Status: SHIPPED | OUTPATIENT
Start: 2021-10-07 | End: 2022-01-10

## 2021-10-07 RX ORDER — HYDROXYZINE 25 MG/1
25 TABLET, FILM COATED ORAL
Qty: 30 TABLET | Refills: 5 | Status: SHIPPED | OUTPATIENT
Start: 2021-10-07 | End: 2021-10-17

## 2021-10-07 RX ORDER — MONTELUKAST SODIUM 10 MG/1
10 TABLET ORAL DAILY
Qty: 30 TABLET | Refills: 1 | Status: SHIPPED | OUTPATIENT
Start: 2021-10-07 | End: 2022-09-09 | Stop reason: SDUPTHER

## 2022-01-10 RX ORDER — ALBUTEROL SULFATE 90 UG/1
AEROSOL, METERED RESPIRATORY (INHALATION)
Qty: 18 G | Refills: 5 | Status: SHIPPED | OUTPATIENT
Start: 2022-01-10 | End: 2022-09-09 | Stop reason: SDUPTHER

## 2022-09-12 RX ORDER — MONTELUKAST SODIUM 10 MG/1
10 TABLET ORAL DAILY
Qty: 30 TABLET | Refills: 1 | Status: SHIPPED | OUTPATIENT
Start: 2022-09-12

## 2022-09-12 RX ORDER — ALBUTEROL SULFATE 90 UG/1
2 AEROSOL, METERED RESPIRATORY (INHALATION)
Qty: 18 G | Refills: 5 | Status: SHIPPED | OUTPATIENT
Start: 2022-09-12

## 2022-11-08 ENCOUNTER — VIRTUAL VISIT (OUTPATIENT)
Dept: INTERNAL MEDICINE CLINIC | Age: 37
End: 2022-11-08
Payer: COMMERCIAL

## 2022-11-08 DIAGNOSIS — J45.21 MILD INTERMITTENT ASTHMA WITH ACUTE EXACERBATION: Primary | ICD-10-CM

## 2022-11-08 DIAGNOSIS — F41.9 ANXIETY: ICD-10-CM

## 2022-11-08 PROCEDURE — 99214 OFFICE O/P EST MOD 30 MIN: CPT | Performed by: FAMILY MEDICINE

## 2022-11-08 RX ORDER — HYDROCHLOROTHIAZIDE 25 MG/1
25 TABLET ORAL DAILY
Qty: 30 TABLET | Refills: 2 | Status: SHIPPED | OUTPATIENT
Start: 2022-11-08 | End: 2022-11-08 | Stop reason: ALTCHOICE

## 2022-11-08 RX ORDER — HYDROXYZINE 25 MG/1
25 TABLET, FILM COATED ORAL
Qty: 30 TABLET | Refills: 2 | Status: SHIPPED | OUTPATIENT
Start: 2022-11-08 | End: 2022-11-18

## 2022-11-08 RX ORDER — MONTELUKAST SODIUM 10 MG/1
10 TABLET ORAL DAILY
Qty: 90 TABLET | Refills: 1 | Status: SHIPPED | OUTPATIENT
Start: 2022-11-08

## 2022-11-08 NOTE — PROGRESS NOTES
Kenny Robledo is a 30 y.o. male who was seen by synchronous (real-time) audio-video technology on 11/8/2022 for Allergies        Assessment & Plan:   Diagnoses and all orders for this visit:    1. Mild intermittent asthma with acute exacerbation  -     montelukast (SINGULAIR) 10 mg tablet; Take 1 Tablet by mouth daily. 2. Anxiety  -     hydrOXYzine HCL (ATARAX) 25 mg tablet; Take 1 Tablet by mouth three (3) times daily as needed for Anxiety for up to 10 days. Patient continue taking current medications and will call back if no resolution of his wheezing. We may consider restarting his Advair at the time      Subjective:   Patient is a 62-year-old male following up on asthma. Patient states that he usually gets asthma flareups in October and early spring. Currently states that he wakes up with wheezing and has to take albuterol nebulizer to help calm things down. He has run out of his Singulair which he states usually keeps that day. Does not take Advair anymore for 2 years. Denies any chest pain shortness of breath YOUNG. Patient also states that his anxiety is well controlled and believes that it was due to situational anxiety at the time. Patient states that he currently has no more panic attacks and does not have any depression. He has stopped taking his Wellbutrin but does take hydroxyzine on occasion. Prior to Admission medications    Medication Sig Start Date End Date Taking? Authorizing Provider   montelukast (SINGULAIR) 10 mg tablet Take 1 Tablet by mouth daily. 9/12/22   Aldair Woodward MD   albuterol (PROVENTIL HFA, VENTOLIN HFA, PROAIR HFA) 90 mcg/actuation inhaler Take 2 Puffs by inhalation every four (4) hours as needed for Wheezing. 9/12/22   Aldair Woodward MD   albuterol (PROVENTIL HFA, VENTOLIN HFA, PROAIR HFA) 90 mcg/actuation inhaler Take 2 Puffs by inhalation every four (4) hours as needed for Wheezing.  9/12/22   Aldair Woodward MD   albuterol (PROVENTIL VENTOLIN) 2.5 mg /3 mL (0.083 %) nebu 3 mL by Nebulization route every four (4) hours as needed for Wheezing. 10/7/21   Enoch Marie MD   traZODone (DESYREL) 50 mg tablet Take 1 Tab by mouth nightly. 3/16/21   Enoch Marie MD   buPROPion XL (WELLBUTRIN XL) 150 mg tablet Take 1 tab daily for week 1, then increase to 2 tabs daily 6/17/20   Enoch Marie MD   fluticasone propion-salmeterol (ADVAIR DISKUS) 100-50 mcg/dose diskus inhaler INHALE 1 PUFF BY MOUTH EVERY 12 HOURS 12/19/19   Enoch Marie MD     There is no problem list on file for this patient. There are no problems to display for this patient. Current Outpatient Medications   Medication Sig Dispense Refill    montelukast (SINGULAIR) 10 mg tablet Take 1 Tablet by mouth daily. 90 Tablet 1    hydrOXYzine HCL (ATARAX) 25 mg tablet Take 1 Tablet by mouth three (3) times daily as needed for Anxiety for up to 10 days. 30 Tablet 2    albuterol (PROVENTIL HFA, VENTOLIN HFA, PROAIR HFA) 90 mcg/actuation inhaler Take 2 Puffs by inhalation every four (4) hours as needed for Wheezing. 18 g 5    albuterol (PROVENTIL VENTOLIN) 2.5 mg /3 mL (0.083 %) nebu 3 mL by Nebulization route every four (4) hours as needed for Wheezing. 24 Each 0    traZODone (DESYREL) 50 mg tablet Take 1 Tab by mouth nightly. 30 Tab 1     No Known Allergies  Past Medical History:   Diagnosis Date    Asthma     Ill-defined condition     L knee     No past surgical history on file. No family history on file.   Social History     Tobacco Use    Smoking status: Never    Smokeless tobacco: Never   Substance Use Topics    Alcohol use: Yes     Comment: socially       ROS    Objective:     Patient-Reported Vitals 11/8/2022   Patient-Reported Weight 225   Patient-Reported Pulse 0   Patient-Reported Temperature 0   Patient-Reported SpO2 0   Patient-Reported Systolic  0   Patient-Reported Diastolic 0   Patient-Reported Peak Flow 0        [INSTRUCTIONS:  \"[x]\" Indicates a positive item  \"[]\" Indicates a negative item  -- DELETE ALL ITEMS NOT EXAMINED]    Constitutional: [x] Appears well-developed and well-nourished [x] No apparent distress      [] Abnormal -     Mental status: [x] Alert and awake  [x] Oriented to person/place/time [x] Able to follow commands    [] Abnormal -     Eyes:   EOM    [x]  Normal    [] Abnormal -   Sclera  [x]  Normal    [] Abnormal -          Discharge [x]  None visible   [] Abnormal -     HENT: [x] Normocephalic, atraumatic  [] Abnormal -   [x] Mouth/Throat: Mucous membranes are moist    External Ears [x] Normal  [] Abnormal -    Neck: [x] No visualized mass [] Abnormal -     Pulmonary/Chest: [x] Respiratory effort normal   [x] No visualized signs of difficulty breathing or respiratory distress        [] Abnormal -      Musculoskeletal:   [x] Normal gait with no signs of ataxia         [x] Normal range of motion of neck        [] Abnormal -     Neurological:        [x] No Facial Asymmetry (Cranial nerve 7 motor function) (limited exam due to video visit)          [x] No gaze palsy        [] Abnormal -          Skin:        [x] No significant exanthematous lesions or discoloration noted on facial skin         [] Abnormal -            Psychiatric:       [x] Normal Affect [] Abnormal -        [x] No Hallucinations    Other pertinent observable physical exam findings:-        We discussed the expected course, resolution and complications of the diagnosis(es) in detail. Medication risks, benefits, costs, interactions, and alternatives were discussed as indicated. I advised him to contact the office if his condition worsens, changes or fails to improve as anticipated. He expressed understanding with the diagnosis(es) and plan. Violette Vidal, was evaluated through a synchronous (real-time) audio-video encounter. The patient (or guardian if applicable) is aware that this is a billable service, which includes applicable co-pays.  This Virtual Visit was conducted with patient's (and/or legal guardian's) consent. The visit was conducted pursuant to the emergency declaration under the [facility], [address] [place] [address] authority and the [employer] and [employer] General Act. Patient identification was verified, and a caregiver was present when appropriate. The patient was located at: [place] [id number]  The provider was located at: Facility (Appt Department): [facility].  Zenaida Zavala MD

## 2022-11-15 RX ORDER — PREDNISONE 20 MG/1
20 TABLET ORAL 3 TIMES DAILY
Qty: 21 TABLET | Refills: 0 | Status: SHIPPED | OUTPATIENT
Start: 2022-11-15 | End: 2022-11-22

## 2022-11-21 RX ORDER — ALBUTEROL SULFATE 0.83 MG/ML
2.5 SOLUTION RESPIRATORY (INHALATION)
Qty: 24 EACH | Refills: 0 | Status: SHIPPED | OUTPATIENT
Start: 2022-11-21

## 2023-01-25 RX ORDER — ALBUTEROL SULFATE 0.83 MG/ML
SOLUTION RESPIRATORY (INHALATION)
Qty: 75 ML | Refills: 2 | Status: SHIPPED | OUTPATIENT
Start: 2023-01-25

## 2023-06-02 ENCOUNTER — OFFICE VISIT (OUTPATIENT)
Age: 38
End: 2023-06-02

## 2023-06-02 VITALS
SYSTOLIC BLOOD PRESSURE: 121 MMHG | WEIGHT: 210 LBS | BODY MASS INDEX: 30.06 KG/M2 | RESPIRATION RATE: 16 BRPM | OXYGEN SATURATION: 98 % | TEMPERATURE: 99.2 F | HEIGHT: 70 IN | HEART RATE: 84 BPM | DIASTOLIC BLOOD PRESSURE: 82 MMHG

## 2023-06-02 DIAGNOSIS — J18.9 COMMUNITY ACQUIRED PNEUMONIA, UNSPECIFIED LATERALITY: Primary | ICD-10-CM

## 2023-06-02 LAB
INFLUENZA A ANTIGEN, POC: NEGATIVE
INFLUENZA B ANTIGEN, POC: NEGATIVE
Lab: NORMAL
QC PASS/FAIL: NORMAL
SARS-COV-2, POC: NORMAL
VALID INTERNAL CONTROL, POC: YES

## 2023-06-02 RX ORDER — HYDRALAZINE HYDROCHLORIDE 25 MG/1
25 TABLET, FILM COATED ORAL 3 TIMES DAILY PRN
COMMUNITY

## 2023-06-02 RX ORDER — AMOXICILLIN AND CLAVULANATE POTASSIUM 875; 125 MG/1; MG/1
1 TABLET, FILM COATED ORAL 2 TIMES DAILY
Qty: 14 TABLET | Refills: 0 | Status: SHIPPED | OUTPATIENT
Start: 2023-06-02 | End: 2023-06-09

## 2023-06-02 RX ORDER — AZITHROMYCIN 250 MG/1
250 TABLET, FILM COATED ORAL SEE ADMIN INSTRUCTIONS
Qty: 6 TABLET | Refills: 0 | Status: SHIPPED | OUTPATIENT
Start: 2023-06-02 | End: 2023-06-07

## 2023-06-02 NOTE — PROGRESS NOTES
Rakel Denis (:  1985) is a 41 y.o. male,New patient, here for evaluation of the following chief complaint(s):  New Patient and Fever (Pt. C/o fever and night sweats starting 2 weeks ago. Pt. Stated every night or it will skip a night and come back. )      ASSESSMENT/PLAN:  Visit Diagnoses and Associated Orders       Community acquired pneumonia, unspecified laterality    -  Primary    POCT COVID-19, SARS-COV-2, PCR [37268 CPT(R)]      AMB POC INFLUENZA A  AND B REAL-TIME RT-PCR [23239 CPT(R)]      XR CHEST (2 VW) [62514 Custom]   - Future Order    amoxicillin-clavulanate (AUGMENTIN) 875-125 MG per tablet [44980]      azithromycin (ZITHROMAX) 250 MG tablet [35002]           ORDERS WITHOUT AN ASSOCIATED DIAGNOSIS    hydrALAZINE (APRESOLINE) 25 MG tablet [9826]                 Suspect early pna and will treat as such. CXR results called to pt after visit. Mucinex, fluids, rest.   Follow up with PCP in PRN days if symptoms persist or ED if symptoms worsen. SUBJECTIVE/OBJECTIVE:    Fever   HPI:   41 y.o. male presents with symptoms of productive cough of dark phlegm, night sweats, and SOB for 1 week, gradually worsening. Measured fever last night (first time he measured) with fever 100.3. Mucous in throat, fatigue, no appetite. Hx asthma and double pna in High school. Nonsmoker. Was on prednisone last week for plantar fasciitis. Using albuterol inh and nebs prn. Taking ibuprofen and tylenol. Quit smoking 2 weeks ago. Vitals:    23 1551   BP: 121/82   Site: Left Upper Arm   Position: Sitting   Cuff Size: Large Adult   Pulse: 84   Resp: 16   Temp: 99.2 °F (37.3 °C)   TempSrc: Oral   SpO2: 98%   Weight: 210 lb (95.3 kg)   Height: 5' 10\" (1.778 m)         Physical Exam  Constitutional:       General: He is not in acute distress. Appearance: Normal appearance. He is ill-appearing. He is not toxic-appearing. HENT:      Head: Normocephalic and atraumatic.       Right Ear: Tympanic

## 2023-06-04 RX ORDER — PREDNISONE 20 MG/1
20 TABLET ORAL DAILY
Qty: 5 TABLET | Refills: 0 | Status: SHIPPED | OUTPATIENT
Start: 2023-06-04 | End: 2023-06-09

## 2023-07-12 ENCOUNTER — APPOINTMENT (OUTPATIENT)
Facility: HOSPITAL | Age: 38
End: 2023-07-12
Payer: COMMERCIAL

## 2023-07-12 ENCOUNTER — HOSPITAL ENCOUNTER (EMERGENCY)
Facility: HOSPITAL | Age: 38
Discharge: HOME | End: 2023-07-12
Payer: COMMERCIAL

## 2023-07-12 VITALS
HEART RATE: 90 BPM | DIASTOLIC BLOOD PRESSURE: 100 MMHG | SYSTOLIC BLOOD PRESSURE: 151 MMHG | OXYGEN SATURATION: 100 % | RESPIRATION RATE: 20 BRPM | TEMPERATURE: 98.2 F

## 2023-07-12 DIAGNOSIS — N10 ACUTE PYELONEPHRITIS: Primary | ICD-10-CM

## 2023-07-12 LAB
ALBUMIN SERPL-MCNC: 3.4 G/DL (ref 3.5–5)
ALBUMIN/GLOB SERPL: 0.8 (ref 1.1–2.2)
ALP SERPL-CCNC: 108 U/L (ref 45–117)
ALT SERPL-CCNC: 29 U/L (ref 12–78)
ANION GAP SERPL CALC-SCNC: 4 MMOL/L (ref 5–15)
APPEARANCE UR: CLEAR
AST SERPL-CCNC: 13 U/L (ref 15–37)
BACTERIA URNS QL MICRO: NEGATIVE /HPF
BASOPHILS # BLD: 0 K/UL (ref 0–0.1)
BASOPHILS NFR BLD: 1 % (ref 0–1)
BILIRUB SERPL-MCNC: 0.3 MG/DL (ref 0.2–1)
BILIRUB UR QL: NEGATIVE
BUN SERPL-MCNC: 8 MG/DL (ref 6–20)
BUN/CREAT SERPL: 7 (ref 12–20)
CALCIUM SERPL-MCNC: 9.1 MG/DL (ref 8.5–10.1)
CHLORIDE SERPL-SCNC: 104 MMOL/L (ref 97–108)
CO2 SERPL-SCNC: 29 MMOL/L (ref 21–32)
COLOR UR: ABNORMAL
CREAT SERPL-MCNC: 1.18 MG/DL (ref 0.7–1.3)
DIFFERENTIAL METHOD BLD: ABNORMAL
EOSINOPHIL # BLD: 0.2 K/UL (ref 0–0.4)
EOSINOPHIL NFR BLD: 3 % (ref 0–7)
EPITH CASTS URNS QL MICRO: ABNORMAL /LPF
ERYTHROCYTE [DISTWIDTH] IN BLOOD BY AUTOMATED COUNT: 13.7 % (ref 11.5–14.5)
GLOBULIN SER CALC-MCNC: 4.1 G/DL (ref 2–4)
GLUCOSE SERPL-MCNC: 293 MG/DL (ref 65–100)
GLUCOSE UR STRIP.AUTO-MCNC: 100 MG/DL
HCT VFR BLD AUTO: 34.5 % (ref 36.6–50.3)
HGB BLD-MCNC: 11.9 G/DL (ref 12.1–17)
HGB UR QL STRIP: NEGATIVE
HYALINE CASTS URNS QL MICRO: ABNORMAL /LPF (ref 0–2)
IMM GRANULOCYTES # BLD AUTO: 0 K/UL (ref 0–0.04)
IMM GRANULOCYTES NFR BLD AUTO: 0 % (ref 0–0.5)
KETONES UR QL STRIP.AUTO: NEGATIVE MG/DL
LEUKOCYTE ESTERASE UR QL STRIP.AUTO: ABNORMAL
LIPASE SERPL-CCNC: 130 U/L (ref 73–393)
LYMPHOCYTES # BLD: 2.2 K/UL (ref 0.8–3.5)
LYMPHOCYTES NFR BLD: 33 % (ref 12–49)
MCH RBC QN AUTO: 33 PG (ref 26–34)
MCHC RBC AUTO-ENTMCNC: 34.5 G/DL (ref 30–36.5)
MCV RBC AUTO: 95.6 FL (ref 80–99)
MONOCYTES # BLD: 0.5 K/UL (ref 0–1)
MONOCYTES NFR BLD: 8 % (ref 5–13)
NEUTS SEG # BLD: 3.7 K/UL (ref 1.8–8)
NEUTS SEG NFR BLD: 55 % (ref 32–75)
NITRITE UR QL STRIP.AUTO: NEGATIVE
NRBC # BLD: 0 K/UL (ref 0–0.01)
NRBC BLD-RTO: 0 PER 100 WBC
PH UR STRIP: 6 (ref 5–8)
PLATELET # BLD AUTO: 283 K/UL (ref 150–400)
PMV BLD AUTO: 10.4 FL (ref 8.9–12.9)
POTASSIUM SERPL-SCNC: 3.5 MMOL/L (ref 3.5–5.1)
PROT SERPL-MCNC: 7.5 G/DL (ref 6.4–8.2)
PROT UR STRIP-MCNC: NEGATIVE MG/DL
RBC # BLD AUTO: 3.61 M/UL (ref 4.1–5.7)
RBC #/AREA URNS HPF: ABNORMAL /HPF (ref 0–5)
SODIUM SERPL-SCNC: 137 MMOL/L (ref 136–145)
SP GR UR REFRACTOMETRY: 1.01
UROBILINOGEN UR QL STRIP.AUTO: 1 EU/DL (ref 0.2–1)
WBC # BLD AUTO: 6.8 K/UL (ref 4.1–11.1)
WBC URNS QL MICRO: ABNORMAL /HPF (ref 0–4)

## 2023-07-12 PROCEDURE — 85025 COMPLETE CBC W/AUTO DIFF WBC: CPT

## 2023-07-12 PROCEDURE — 96375 TX/PRO/DX INJ NEW DRUG ADDON: CPT

## 2023-07-12 PROCEDURE — 6370000000 HC RX 637 (ALT 250 FOR IP)

## 2023-07-12 PROCEDURE — 99285 EMERGENCY DEPT VISIT HI MDM: CPT

## 2023-07-12 PROCEDURE — 96365 THER/PROPH/DIAG IV INF INIT: CPT

## 2023-07-12 PROCEDURE — 6360000002 HC RX W HCPCS

## 2023-07-12 PROCEDURE — 83690 ASSAY OF LIPASE: CPT

## 2023-07-12 PROCEDURE — 2580000003 HC RX 258

## 2023-07-12 PROCEDURE — 76700 US EXAM ABDOM COMPLETE: CPT

## 2023-07-12 PROCEDURE — 36415 COLL VENOUS BLD VENIPUNCTURE: CPT

## 2023-07-12 PROCEDURE — 74177 CT ABD & PELVIS W/CONTRAST: CPT

## 2023-07-12 PROCEDURE — 96372 THER/PROPH/DIAG INJ SC/IM: CPT

## 2023-07-12 PROCEDURE — 87086 URINE CULTURE/COLONY COUNT: CPT

## 2023-07-12 PROCEDURE — 80053 COMPREHEN METABOLIC PANEL: CPT

## 2023-07-12 PROCEDURE — 6360000004 HC RX CONTRAST MEDICATION

## 2023-07-12 PROCEDURE — 81001 URINALYSIS AUTO W/SCOPE: CPT

## 2023-07-12 RX ORDER — KETOROLAC TROMETHAMINE 10 MG/1
10 TABLET, FILM COATED ORAL EVERY 6 HOURS PRN
Qty: 20 TABLET | Refills: 0 | Status: SHIPPED | OUTPATIENT
Start: 2023-07-12 | End: 2023-07-17

## 2023-07-12 RX ORDER — ONDANSETRON 2 MG/ML
4 INJECTION INTRAMUSCULAR; INTRAVENOUS
Status: COMPLETED | OUTPATIENT
Start: 2023-07-12 | End: 2023-07-12

## 2023-07-12 RX ORDER — DICYCLOMINE HYDROCHLORIDE 10 MG/1
10 CAPSULE ORAL 4 TIMES DAILY
Qty: 12 CAPSULE | Refills: 0 | Status: SHIPPED | OUTPATIENT
Start: 2023-07-12 | End: 2023-07-15

## 2023-07-12 RX ORDER — DICYCLOMINE HCL 20 MG
20 TABLET ORAL
Status: COMPLETED | OUTPATIENT
Start: 2023-07-12 | End: 2023-07-12

## 2023-07-12 RX ORDER — ONDANSETRON 4 MG/1
4 TABLET, ORALLY DISINTEGRATING ORAL EVERY 8 HOURS PRN
Qty: 10 TABLET | Refills: 0 | Status: SHIPPED | OUTPATIENT
Start: 2023-07-12

## 2023-07-12 RX ORDER — CEFDINIR 300 MG/1
300 CAPSULE ORAL 2 TIMES DAILY
Qty: 20 CAPSULE | Refills: 0 | Status: SHIPPED | OUTPATIENT
Start: 2023-07-12 | End: 2023-07-22

## 2023-07-12 RX ORDER — DICYCLOMINE HYDROCHLORIDE 10 MG/ML
20 INJECTION INTRAMUSCULAR
Status: DISCONTINUED | OUTPATIENT
Start: 2023-07-12 | End: 2023-07-12

## 2023-07-12 RX ORDER — KETOROLAC TROMETHAMINE 30 MG/ML
30 INJECTION, SOLUTION INTRAMUSCULAR; INTRAVENOUS ONCE
Status: COMPLETED | OUTPATIENT
Start: 2023-07-12 | End: 2023-07-12

## 2023-07-12 RX ADMIN — KETOROLAC TROMETHAMINE 30 MG: 30 INJECTION, SOLUTION INTRAMUSCULAR; INTRAVENOUS at 18:52

## 2023-07-12 RX ADMIN — ONDANSETRON 4 MG: 2 INJECTION INTRAMUSCULAR; INTRAVENOUS at 18:52

## 2023-07-12 RX ADMIN — IOPAMIDOL 100 ML: 755 INJECTION, SOLUTION INTRAVENOUS at 19:51

## 2023-07-12 RX ADMIN — SODIUM CHLORIDE 1000 MG: 900 INJECTION INTRAVENOUS at 21:38

## 2023-07-12 RX ADMIN — DICYCLOMINE HYDROCHLORIDE 20 MG: 20 TABLET ORAL at 18:52

## 2023-07-12 RX ADMIN — ALUMINUM HYDROXIDE AND MAGNESIUM HYDROXIDE 20 ML: 200; 200 SUSPENSION ORAL at 18:52

## 2023-07-12 ASSESSMENT — PAIN SCALES - GENERAL: PAINLEVEL_OUTOF10: 4

## 2023-07-13 ENCOUNTER — NURSE ONLY (OUTPATIENT)
Facility: CLINIC | Age: 38
End: 2023-07-13

## 2023-07-13 DIAGNOSIS — Z00.00 VISIT FOR WELL MAN HEALTH CHECK: ICD-10-CM

## 2023-07-13 DIAGNOSIS — R73.9 ELEVATED BLOOD SUGAR: ICD-10-CM

## 2023-07-13 LAB
BACTERIA SPEC CULT: NORMAL
SERVICE CMNT-IMP: NORMAL

## 2023-07-13 NOTE — ED NOTES
Patient discharged from the ED by Jluis Goncalves RN. Diagnosis, medications, precautions and follow-ups were reviewed with the patient/family. Questions were asked and answered prior to departure.  Patient departed the ED via ALEXANDRA Huitron  07/12/23 5660

## 2023-07-14 ENCOUNTER — APPOINTMENT (OUTPATIENT)
Facility: HOSPITAL | Age: 38
End: 2023-07-14
Payer: COMMERCIAL

## 2023-07-14 ENCOUNTER — TELEPHONE (OUTPATIENT)
Facility: CLINIC | Age: 38
End: 2023-07-14

## 2023-07-14 ENCOUNTER — HOSPITAL ENCOUNTER (EMERGENCY)
Facility: HOSPITAL | Age: 38
Discharge: HOME | End: 2023-07-14
Attending: STUDENT IN AN ORGANIZED HEALTH CARE EDUCATION/TRAINING PROGRAM
Payer: COMMERCIAL

## 2023-07-14 VITALS
RESPIRATION RATE: 18 BRPM | DIASTOLIC BLOOD PRESSURE: 98 MMHG | TEMPERATURE: 98 F | SYSTOLIC BLOOD PRESSURE: 133 MMHG | OXYGEN SATURATION: 100 % | HEART RATE: 83 BPM

## 2023-07-14 DIAGNOSIS — K42.9 UMBILICAL HERNIA WITHOUT OBSTRUCTION AND WITHOUT GANGRENE: ICD-10-CM

## 2023-07-14 DIAGNOSIS — R10.9 ABDOMINAL PAIN, UNSPECIFIED ABDOMINAL LOCATION: Primary | ICD-10-CM

## 2023-07-14 LAB
ALBUMIN SERPL-MCNC: 3.9 G/DL (ref 3.5–5)
ALBUMIN/GLOB SERPL: 0.8 (ref 1.1–2.2)
ALP SERPL-CCNC: 122 U/L (ref 45–117)
ALT SERPL-CCNC: 40 U/L (ref 12–78)
ANION GAP SERPL CALC-SCNC: 7 MMOL/L (ref 5–15)
APPEARANCE UR: CLEAR
AST SERPL-CCNC: 27 U/L (ref 15–37)
BACTERIA URNS QL MICRO: NEGATIVE /HPF
BASOPHILS # BLD: 0 K/UL (ref 0–0.1)
BASOPHILS NFR BLD: 0 % (ref 0–1)
BILIRUB SERPL-MCNC: 0.4 MG/DL (ref 0.2–1)
BILIRUB UR QL: NEGATIVE
BUN SERPL-MCNC: 9 MG/DL (ref 6–20)
BUN/CREAT SERPL: 8 (ref 12–20)
CALCIUM SERPL-MCNC: 9.1 MG/DL (ref 8.5–10.1)
CHLORIDE SERPL-SCNC: 101 MMOL/L (ref 97–108)
CO2 SERPL-SCNC: 28 MMOL/L (ref 21–32)
COLOR UR: ABNORMAL
COMMENT:: NORMAL
CREAT SERPL-MCNC: 1.09 MG/DL (ref 0.7–1.3)
DIFFERENTIAL METHOD BLD: NORMAL
EOSINOPHIL # BLD: 0.2 K/UL (ref 0–0.4)
EOSINOPHIL NFR BLD: 3 % (ref 0–7)
EPITH CASTS URNS QL MICRO: ABNORMAL /LPF
ERYTHROCYTE [DISTWIDTH] IN BLOOD BY AUTOMATED COUNT: 13.9 % (ref 11.5–14.5)
EST. AVERAGE GLUCOSE BLD GHB EST-MCNC: 174 MG/DL
GLOBULIN SER CALC-MCNC: 4.9 G/DL (ref 2–4)
GLUCOSE SERPL-MCNC: 132 MG/DL (ref 65–100)
GLUCOSE UR STRIP.AUTO-MCNC: NEGATIVE MG/DL
HBA1C MFR BLD: 7.7 % (ref 4–5.6)
HCT VFR BLD AUTO: 40.9 % (ref 36.6–50.3)
HGB BLD-MCNC: 13.7 G/DL (ref 12.1–17)
HGB UR QL STRIP: NEGATIVE
HYALINE CASTS URNS QL MICRO: ABNORMAL /LPF (ref 0–5)
IMM GRANULOCYTES # BLD AUTO: 0 K/UL (ref 0–0.04)
IMM GRANULOCYTES NFR BLD AUTO: 0 % (ref 0–0.5)
KETONES UR QL STRIP.AUTO: NEGATIVE MG/DL
LEUKOCYTE ESTERASE UR QL STRIP.AUTO: ABNORMAL
LIPASE SERPL-CCNC: 99 U/L (ref 73–393)
LYMPHOCYTES # BLD: 1.8 K/UL (ref 0.8–3.5)
LYMPHOCYTES NFR BLD: 26 % (ref 12–49)
MCH RBC QN AUTO: 31.7 PG (ref 26–34)
MCHC RBC AUTO-ENTMCNC: 33.5 G/DL (ref 30–36.5)
MCV RBC AUTO: 94.7 FL (ref 80–99)
MONOCYTES # BLD: 0.5 K/UL (ref 0–1)
MONOCYTES NFR BLD: 7 % (ref 5–13)
NEUTS SEG # BLD: 4.3 K/UL (ref 1.8–8)
NEUTS SEG NFR BLD: 64 % (ref 32–75)
NITRITE UR QL STRIP.AUTO: NEGATIVE
NRBC # BLD: 0 K/UL (ref 0–0.01)
NRBC BLD-RTO: 0 PER 100 WBC
PH UR STRIP: 6.5 (ref 5–8)
PLATELET # BLD AUTO: 293 K/UL (ref 150–400)
PMV BLD AUTO: 10.2 FL (ref 8.9–12.9)
POTASSIUM SERPL-SCNC: 3.5 MMOL/L (ref 3.5–5.1)
PROT SERPL-MCNC: 8.8 G/DL (ref 6.4–8.2)
PROT UR STRIP-MCNC: NEGATIVE MG/DL
RBC # BLD AUTO: 4.32 M/UL (ref 4.1–5.7)
RBC #/AREA URNS HPF: ABNORMAL /HPF (ref 0–5)
SODIUM SERPL-SCNC: 136 MMOL/L (ref 136–145)
SP GR UR REFRACTOMETRY: 1 (ref 1–1.03)
SPECIMEN HOLD: NORMAL
SPECIMEN HOLD: NORMAL
TSH SERPL DL<=0.05 MIU/L-ACNC: 0.73 UIU/ML (ref 0.36–3.74)
UROBILINOGEN UR QL STRIP.AUTO: 0.2 EU/DL (ref 0.2–1)
WBC # BLD AUTO: 6.8 K/UL (ref 4.1–11.1)
WBC URNS QL MICRO: ABNORMAL /HPF (ref 0–4)

## 2023-07-14 PROCEDURE — 6360000004 HC RX CONTRAST MEDICATION: Performed by: STUDENT IN AN ORGANIZED HEALTH CARE EDUCATION/TRAINING PROGRAM

## 2023-07-14 PROCEDURE — 83690 ASSAY OF LIPASE: CPT

## 2023-07-14 PROCEDURE — 74177 CT ABD & PELVIS W/CONTRAST: CPT

## 2023-07-14 PROCEDURE — 6360000002 HC RX W HCPCS: Performed by: FAMILY MEDICINE

## 2023-07-14 PROCEDURE — 96374 THER/PROPH/DIAG INJ IV PUSH: CPT

## 2023-07-14 PROCEDURE — C9113 INJ PANTOPRAZOLE SODIUM, VIA: HCPCS | Performed by: FAMILY MEDICINE

## 2023-07-14 PROCEDURE — 85025 COMPLETE CBC W/AUTO DIFF WBC: CPT

## 2023-07-14 PROCEDURE — 80053 COMPREHEN METABOLIC PANEL: CPT

## 2023-07-14 PROCEDURE — 2580000003 HC RX 258: Performed by: FAMILY MEDICINE

## 2023-07-14 PROCEDURE — 87086 URINE CULTURE/COLONY COUNT: CPT

## 2023-07-14 PROCEDURE — A4216 STERILE WATER/SALINE, 10 ML: HCPCS | Performed by: FAMILY MEDICINE

## 2023-07-14 PROCEDURE — 81001 URINALYSIS AUTO W/SCOPE: CPT

## 2023-07-14 PROCEDURE — 99285 EMERGENCY DEPT VISIT HI MDM: CPT

## 2023-07-14 PROCEDURE — 36415 COLL VENOUS BLD VENIPUNCTURE: CPT

## 2023-07-14 RX ORDER — 0.9 % SODIUM CHLORIDE 0.9 %
1000 INTRAVENOUS SOLUTION INTRAVENOUS ONCE
Status: COMPLETED | OUTPATIENT
Start: 2023-07-14 | End: 2023-07-14

## 2023-07-14 RX ORDER — FAMOTIDINE 20 MG/1
20 TABLET, FILM COATED ORAL 2 TIMES DAILY
Qty: 60 TABLET | Refills: 0 | Status: SHIPPED | OUTPATIENT
Start: 2023-07-14 | End: 2023-08-13

## 2023-07-14 RX ORDER — PANTOPRAZOLE SODIUM 40 MG/1
40 TABLET, DELAYED RELEASE ORAL
Qty: 30 TABLET | Refills: 5 | Status: SHIPPED | OUTPATIENT
Start: 2023-07-14

## 2023-07-14 RX ADMIN — SODIUM CHLORIDE 1000 ML: 9 INJECTION, SOLUTION INTRAVENOUS at 14:13

## 2023-07-14 RX ADMIN — PANTOPRAZOLE SODIUM 40 MG: 40 INJECTION, POWDER, LYOPHILIZED, FOR SOLUTION INTRAVENOUS at 14:13

## 2023-07-14 RX ADMIN — IOPAMIDOL 100 ML: 755 INJECTION, SOLUTION INTRAVENOUS at 15:04

## 2023-07-14 ASSESSMENT — PAIN DESCRIPTION - ORIENTATION: ORIENTATION: MID

## 2023-07-14 ASSESSMENT — ENCOUNTER SYMPTOMS
ABDOMINAL PAIN: 1
COUGH: 0
BACK PAIN: 0
VOMITING: 0
NAUSEA: 0
SHORTNESS OF BREATH: 0

## 2023-07-14 ASSESSMENT — PAIN DESCRIPTION - DESCRIPTORS: DESCRIPTORS: ACHING

## 2023-07-14 ASSESSMENT — PAIN DESCRIPTION - LOCATION: LOCATION: ABDOMEN

## 2023-07-14 ASSESSMENT — PAIN - FUNCTIONAL ASSESSMENT: PAIN_FUNCTIONAL_ASSESSMENT: 0-10

## 2023-07-14 ASSESSMENT — PAIN SCALES - GENERAL: PAINLEVEL_OUTOF10: 8

## 2023-07-14 NOTE — TELEPHONE ENCOUNTER
Patient would like to discuss recent labs and medication prescribed Patient would like a call back.  Thank you

## 2023-07-14 NOTE — ED PROVIDER NOTES
[facility] EMERGENCY DEP  EMERGENCY DEPARTMENT ENCOUNTER      Pt Name: Winnie Rangel  MRN: [id number]  [address] [facility] [place] 1985  Date of evaluation: 7/14/2023  Provider: CARMEN Zavala NP    CHIEF COMPLAINT       Chief Complaint   Patient presents with    Abdominal Pain         HISTORY OF PRESENT ILLNESS   (Location/Symptom, Timing/Onset, Context/Setting, Quality, Duration, Modifying Factors, Severity)  Note limiting factors. Patient is a 73-year-old male with no significant past medical history presenting for evaluation of multiple complaints. Reports that 2 days ago he was seen at [facility] for abdominal pain and he was diagnosed with a UTI and/or pyelonephritis. He was placed on cefdinir. He noted that his blood sugar was elevated at that visit. He had a hemoglobin A1c drawn and that was elevated to 7.7. He had a follow-up appointment with his primary care nurse, who started him on metformin. Patient reports that his symptoms have not improved despite the antibiotic therapy. He reports generalized abdominal pain, worse in the periumbilical and epigastric region. No radiation of pain to his back. Pain is worsened after drinking EtOH, or taking BC powder. Reports that he is additionally had intermittent dizziness and constipation. No nausea or vomiting. No fevers. No other complaints. The history is provided by the patient. Review of External Medical Records:     Nursing Notes were reviewed. REVIEW OF SYSTEMS    (2-9 systems for level 4, 10 or more for level 5)     Review of Systems   Constitutional:  Negative for unexpected weight change. HENT:  Negative for congestion. Eyes:  Negative for visual disturbance. Respiratory:  Negative for cough and shortness of breath. Cardiovascular:  Negative for chest pain and palpitations. Gastrointestinal:  Positive for abdominal pain. Negative for nausea and vomiting. Endocrine: Negative for polyuria.

## 2023-07-14 NOTE — ED TRIAGE NOTES
Pt reports abdominal pain x1week and dizziness and constipation x2 days. Pt went to [address] [place] [address] 2 days ago, CT showed kidney inflammation, diagnosed UTI and placed him on Cefdinir, and BG was elevated. Pt went to PCP yesterday to follow up about possible DM.

## 2023-07-14 NOTE — ED NOTES
Discharge paperwork reviewed with pt & questions answered by Pravin Winter NP. IV taken out. Pt walked off unit in no apparent acute distress.      Ari Damon RN  07/14/23 0648

## 2023-07-14 NOTE — DISCHARGE INSTRUCTIONS
Your labs today were without leukocytosis. Your urine was unchanged from your visit 2 days ago. I reviewed her urine culture from 2 days ago, which had no growth. Your CT today has not had evidence of pyelonephritis. Your blood sugar was 132 today. I reviewed your outpatient hemoglobin A1c and this was 7.7. This is indicative that you do have diabetes. I would continue the metformin that your primary care started you on. Please note that metformin could cause transient GI upset. I suspect that your abdominal pain could be a result of acid reflux and/or peptic ulcer disease. I am starting you on medication to take daily for this. I am additionally referring to GI. You did have incidental finding of umbilical hernia on CT. You may follow-up with general surgery for further evaluation of this.

## 2023-07-15 LAB
BACTERIA SPEC CULT: NORMAL
SERVICE CMNT-IMP: NORMAL

## 2023-07-27 ENCOUNTER — TELEMEDICINE (OUTPATIENT)
Facility: CLINIC | Age: 38
End: 2023-07-27
Payer: COMMERCIAL

## 2023-07-27 DIAGNOSIS — E11.69 CONTROLLED TYPE 2 DIABETES MELLITUS WITH OTHER SPECIFIED COMPLICATION, WITHOUT LONG-TERM CURRENT USE OF INSULIN (HCC): Primary | ICD-10-CM

## 2023-07-27 DIAGNOSIS — R10.13 EPIGASTRIC PAIN: ICD-10-CM

## 2023-07-27 PROCEDURE — 3051F HG A1C>EQUAL 7.0%<8.0%: CPT | Performed by: FAMILY MEDICINE

## 2023-07-27 PROCEDURE — 99214 OFFICE O/P EST MOD 30 MIN: CPT | Performed by: FAMILY MEDICINE

## 2023-07-27 RX ORDER — PANTOPRAZOLE SODIUM 40 MG/1
40 TABLET, DELAYED RELEASE ORAL
Qty: 30 TABLET | Refills: 5 | Status: SHIPPED | OUTPATIENT
Start: 2023-07-27

## 2023-07-27 ASSESSMENT — PATIENT HEALTH QUESTIONNAIRE - PHQ9
SUM OF ALL RESPONSES TO PHQ QUESTIONS 1-9: 0
SUM OF ALL RESPONSES TO PHQ QUESTIONS 1-9: 0
1. LITTLE INTEREST OR PLEASURE IN DOING THINGS: 0
SUM OF ALL RESPONSES TO PHQ QUESTIONS 1-9: 0
SUM OF ALL RESPONSES TO PHQ9 QUESTIONS 1 & 2: 0
SUM OF ALL RESPONSES TO PHQ QUESTIONS 1-9: 0
2. FEELING DOWN, DEPRESSED OR HOPELESS: 0

## 2023-07-27 NOTE — PROGRESS NOTES
item  \"[]\" Indicates a negative item  -- DELETE ALL ITEMS NOT EXAMINED]    Constitutional: [x] Appears well-developed and well-nourished [x] No apparent distress      [] Abnormal -     Mental status: [x] Alert and awake  [x] Oriented to person/place/time [x] Able to follow commands    [] Abnormal -     Eyes:   EOM    [x]  Normal    [] Abnormal -   Sclera  [x]  Normal    [] Abnormal -          Discharge [x]  None visible   [] Abnormal -     HENT: [x] Normocephalic, atraumatic  [] Abnormal -   [x] Mouth/Throat: Mucous membranes are moist    External Ears [x] Normal  [] Abnormal -    Neck: [x] No visualized mass [] Abnormal -     Pulmonary/Chest: [x] Respiratory effort normal   [x] No visualized signs of difficulty breathing or respiratory distress        [] Abnormal -      Musculoskeletal:   [x] Normal gait with no signs of ataxia         [x] Normal range of motion of neck        [] Abnormal -     Neurological:        [x] No Facial Asymmetry (Cranial nerve 7 motor function) (limited exam due to video visit)          [x] No gaze palsy        [] Abnormal -          Skin:        [x] No significant exanthematous lesions or discoloration noted on facial skin         [] Abnormal -            Psychiatric:       [x] Normal Affect [] Abnormal -        [x] No Hallucinations    Other pertinent observable physical exam findings:-             Niko Landa, was evaluated through a synchronous (real-time) audio-video encounter. The patient (or guardian if applicable) is aware that this is a billable service, which includes applicable co-pays. This Virtual Visit was conducted with patient's (and/or legal guardian's) consent. Patient identification was verified, and a caregiver was present when appropriate.    The patient was located at Home: [address] [place] [id number]  Provider was located at [facility] ([address] [place] [address]): [address] [facility],  [address] [place]

## 2024-01-26 ENCOUNTER — OFFICE VISIT (OUTPATIENT)
Facility: CLINIC | Age: 39
End: 2024-01-26
Payer: COMMERCIAL

## 2024-01-26 VITALS
RESPIRATION RATE: 16 BRPM | SYSTOLIC BLOOD PRESSURE: 96 MMHG | TEMPERATURE: 98.3 F | HEIGHT: 70 IN | BODY MASS INDEX: 29.55 KG/M2 | WEIGHT: 206.4 LBS | DIASTOLIC BLOOD PRESSURE: 65 MMHG | HEART RATE: 83 BPM | OXYGEN SATURATION: 96 %

## 2024-01-26 DIAGNOSIS — E11.69 CONTROLLED TYPE 2 DIABETES MELLITUS WITH OTHER SPECIFIED COMPLICATION, WITHOUT LONG-TERM CURRENT USE OF INSULIN (HCC): Primary | ICD-10-CM

## 2024-01-26 PROCEDURE — 99214 OFFICE O/P EST MOD 30 MIN: CPT | Performed by: FAMILY MEDICINE

## 2024-01-26 RX ORDER — ALBUTEROL SULFATE 2.5 MG/3ML
SOLUTION RESPIRATORY (INHALATION)
Qty: 120 EACH | Refills: 3 | Status: SHIPPED | OUTPATIENT
Start: 2024-01-26

## 2024-01-26 RX ORDER — HYDRALAZINE HYDROCHLORIDE 25 MG/1
25 TABLET, FILM COATED ORAL 3 TIMES DAILY
Qty: 90 TABLET | Refills: 3 | Status: SHIPPED | OUTPATIENT
Start: 2024-01-26

## 2024-01-26 SDOH — ECONOMIC STABILITY: FOOD INSECURITY: WITHIN THE PAST 12 MONTHS, THE FOOD YOU BOUGHT JUST DIDN'T LAST AND YOU DIDN'T HAVE MONEY TO GET MORE.: NEVER TRUE

## 2024-01-26 SDOH — ECONOMIC STABILITY: FOOD INSECURITY: WITHIN THE PAST 12 MONTHS, YOU WORRIED THAT YOUR FOOD WOULD RUN OUT BEFORE YOU GOT MONEY TO BUY MORE.: NEVER TRUE

## 2024-01-26 SDOH — ECONOMIC STABILITY: INCOME INSECURITY: HOW HARD IS IT FOR YOU TO PAY FOR THE VERY BASICS LIKE FOOD, HOUSING, MEDICAL CARE, AND HEATING?: NOT HARD AT ALL

## 2024-01-26 SDOH — ECONOMIC STABILITY: HOUSING INSECURITY
IN THE LAST 12 MONTHS, WAS THERE A TIME WHEN YOU DID NOT HAVE A STEADY PLACE TO SLEEP OR SLEPT IN A SHELTER (INCLUDING NOW)?: NO

## 2024-01-26 ASSESSMENT — PATIENT HEALTH QUESTIONNAIRE - PHQ9
SUM OF ALL RESPONSES TO PHQ9 QUESTIONS 1 & 2: 0
SUM OF ALL RESPONSES TO PHQ QUESTIONS 1-9: 0
2. FEELING DOWN, DEPRESSED OR HOPELESS: 0
SUM OF ALL RESPONSES TO PHQ QUESTIONS 1-9: 0
1. LITTLE INTEREST OR PLEASURE IN DOING THINGS: 0
SUM OF ALL RESPONSES TO PHQ QUESTIONS 1-9: 0
SUM OF ALL RESPONSES TO PHQ QUESTIONS 1-9: 0

## 2024-01-26 ASSESSMENT — ANXIETY QUESTIONNAIRES
2. NOT BEING ABLE TO STOP OR CONTROL WORRYING: 0
GAD7 TOTAL SCORE: 6
5. BEING SO RESTLESS THAT IT IS HARD TO SIT STILL: 1
1. FEELING NERVOUS, ANXIOUS, OR ON EDGE: 1
4. TROUBLE RELAXING: 1
7. FEELING AFRAID AS IF SOMETHING AWFUL MIGHT HAPPEN: 1
IF YOU CHECKED OFF ANY PROBLEMS ON THIS QUESTIONNAIRE, HOW DIFFICULT HAVE THESE PROBLEMS MADE IT FOR YOU TO DO YOUR WORK, TAKE CARE OF THINGS AT HOME, OR GET ALONG WITH OTHER PEOPLE: SOMEWHAT DIFFICULT
6. BECOMING EASILY ANNOYED OR IRRITABLE: 2
3. WORRYING TOO MUCH ABOUT DIFFERENT THINGS: 0

## 2024-01-26 NOTE — PROGRESS NOTES
Chief Complaint   Patient presents with    Diabetes     Patient is present for a A1c checkup       Chief Complaint   Patient presents with    Diabetes     Patient is present for a A1c checkup     he is a 38 y.o. year old male who presents for follow-up of A1c check    Diabetes - Pt well controlled on patent states not taking any medication but a herbal supplement.      is  checking BS at home.  denies hypoglycemia symptoms.  denies neuropathy symptoms.  Last eye exam hasn't had  eye exam in over a year.  Last foot exam and has not had a foot exam.    Questionaire:  Diabetes Report Card   1) Have you seen the eye doctor in past year?no    2) How would you  rate your Diabetic Diet?good   3) How well do you take care of your feet?good   4) Do you keep your Primary Care Follow Up Appts?yes    5) Do you know your A1C goal?no   6) Do you take your medications daily?no    7) Do you check your blood sugars?yes    8) Have you gained weight?no    9) Have you lost weight?no    10) Do you follow an exercise program?no    11) Can you do better?yes            No results found for: \"HBA1C\"  Lab Results   Component Value Date/Time    CHOL 145 06/14/2023 09:57 AM    HDL 44 06/14/2023 09:57 AM     No results found for: \"MCA2\", \"MCAU\"      Reviewed and agree with Nurse Note and duplicated in this note.  Reviewed PmHx, RxHx, FmHx, SocHx, AllgHx and updated and dated in the chart.    No family history on file.    Past Medical History:   Diagnosis Date    Asthma     Ill-defined condition     L knee      Social History     Socioeconomic History    Marital status:      Spouse name: None    Number of children: None    Years of education: None    Highest education level: None   Tobacco Use    Smoking status: Never    Smokeless tobacco: Never   Substance and Sexual Activity    Alcohol use: Yes    Drug use: No   Social History Narrative    Yazidi hilda, Attends Speaking [employer]     Social Determinants of Health

## 2024-01-27 LAB
EST. AVERAGE GLUCOSE BLD GHB EST-MCNC: 137 MG/DL
HBA1C MFR BLD: 6.4 % (ref 4–5.6)

## 2024-02-06 ENCOUNTER — TELEPHONE (OUTPATIENT)
Facility: CLINIC | Age: 39
End: 2024-02-06

## 2024-02-06 NOTE — TELEPHONE ENCOUNTER
Patient is confused ad wondering why he is taking hydralazine and dont have blood pressure issues? He thought you prescribed him Hydroxyzine 25 mg patient prefers this medication

## 2024-02-07 RX ORDER — HYDROXYZINE HYDROCHLORIDE 25 MG/1
25 TABLET, FILM COATED ORAL EVERY 8 HOURS PRN
Qty: 60 TABLET | Refills: 5 | Status: SHIPPED | OUTPATIENT
Start: 2024-02-07 | End: 2024-03-08

## 2024-02-20 ENCOUNTER — HOSPITAL ENCOUNTER (EMERGENCY)
Facility: HOSPITAL | Age: 39
Discharge: HOME | End: 2024-02-20
Attending: STUDENT IN AN ORGANIZED HEALTH CARE EDUCATION/TRAINING PROGRAM
Payer: COMMERCIAL

## 2024-02-20 ENCOUNTER — APPOINTMENT (OUTPATIENT)
Facility: HOSPITAL | Age: 39
End: 2024-02-20
Payer: COMMERCIAL

## 2024-02-20 ENCOUNTER — TELEMEDICINE (OUTPATIENT)
Facility: CLINIC | Age: 39
End: 2024-02-20
Payer: COMMERCIAL

## 2024-02-20 VITALS
RESPIRATION RATE: 16 BRPM | TEMPERATURE: 98.2 F | SYSTOLIC BLOOD PRESSURE: 153 MMHG | HEART RATE: 89 BPM | DIASTOLIC BLOOD PRESSURE: 100 MMHG | BODY MASS INDEX: 30.4 KG/M2 | WEIGHT: 211.86 LBS | OXYGEN SATURATION: 98 %

## 2024-02-20 DIAGNOSIS — R10.9 RIGHT SIDED ABDOMINAL PAIN: Primary | ICD-10-CM

## 2024-02-20 DIAGNOSIS — R10.11 RIGHT UPPER QUADRANT ABDOMINAL PAIN: Primary | ICD-10-CM

## 2024-02-20 LAB
ALBUMIN SERPL-MCNC: 3.6 G/DL (ref 3.5–5)
ALBUMIN/GLOB SERPL: 0.8 (ref 1.1–2.2)
ALP SERPL-CCNC: 104 U/L (ref 45–117)
ALT SERPL-CCNC: 62 U/L (ref 12–78)
ANION GAP SERPL CALC-SCNC: 3 MMOL/L (ref 5–15)
APPEARANCE UR: CLEAR
AST SERPL-CCNC: 44 U/L (ref 15–37)
BACTERIA URNS QL MICRO: NEGATIVE /HPF
BASOPHILS # BLD: 0 K/UL (ref 0–0.1)
BASOPHILS NFR BLD: 0 % (ref 0–1)
BILIRUB SERPL-MCNC: 0.6 MG/DL (ref 0.2–1)
BILIRUB UR QL: NEGATIVE
BUN SERPL-MCNC: 8 MG/DL (ref 6–20)
BUN/CREAT SERPL: 8 (ref 12–20)
CALCIUM SERPL-MCNC: 9.2 MG/DL (ref 8.5–10.1)
CHLORIDE SERPL-SCNC: 103 MMOL/L (ref 97–108)
CO2 SERPL-SCNC: 29 MMOL/L (ref 21–32)
COLOR UR: NORMAL
COMMENT:: NORMAL
CREAT SERPL-MCNC: 0.96 MG/DL (ref 0.7–1.3)
DIFFERENTIAL METHOD BLD: ABNORMAL
EOSINOPHIL # BLD: 0 K/UL (ref 0–0.4)
EOSINOPHIL NFR BLD: 0 % (ref 0–7)
EPITH CASTS URNS QL MICRO: NORMAL /LPF
ERYTHROCYTE [DISTWIDTH] IN BLOOD BY AUTOMATED COUNT: 15 % (ref 11.5–14.5)
GLOBULIN SER CALC-MCNC: 4.4 G/DL (ref 2–4)
GLUCOSE SERPL-MCNC: 146 MG/DL (ref 65–100)
GLUCOSE UR STRIP.AUTO-MCNC: NEGATIVE MG/DL
HCT VFR BLD AUTO: 41.9 % (ref 36.6–50.3)
HGB BLD-MCNC: 14.9 G/DL (ref 12.1–17)
HGB UR QL STRIP: NEGATIVE
HYALINE CASTS URNS QL MICRO: NORMAL /LPF (ref 0–5)
IMM GRANULOCYTES # BLD AUTO: 0 K/UL (ref 0–0.04)
IMM GRANULOCYTES NFR BLD AUTO: 0 % (ref 0–0.5)
KETONES UR QL STRIP.AUTO: NEGATIVE MG/DL
LEUKOCYTE ESTERASE UR QL STRIP.AUTO: NEGATIVE
LIPASE SERPL-CCNC: 25 U/L (ref 13–75)
LYMPHOCYTES # BLD: 1.4 K/UL (ref 0.8–3.5)
LYMPHOCYTES NFR BLD: 28 % (ref 12–49)
MCH RBC QN AUTO: 33.3 PG (ref 26–34)
MCHC RBC AUTO-ENTMCNC: 35.6 G/DL (ref 30–36.5)
MCV RBC AUTO: 93.5 FL (ref 80–99)
MONOCYTES # BLD: 0.4 K/UL (ref 0–1)
MONOCYTES NFR BLD: 8 % (ref 5–13)
NEUTS SEG # BLD: 3.2 K/UL (ref 1.8–8)
NEUTS SEG NFR BLD: 64 % (ref 32–75)
NITRITE UR QL STRIP.AUTO: NEGATIVE
NRBC # BLD: 0 K/UL (ref 0–0.01)
NRBC BLD-RTO: 0 PER 100 WBC
PH UR STRIP: 8 (ref 5–8)
PLATELET # BLD AUTO: 280 K/UL (ref 150–400)
PMV BLD AUTO: 9.9 FL (ref 8.9–12.9)
POTASSIUM SERPL-SCNC: 4 MMOL/L (ref 3.5–5.1)
PROT SERPL-MCNC: 8 G/DL (ref 6.4–8.2)
PROT UR STRIP-MCNC: NEGATIVE MG/DL
RBC # BLD AUTO: 4.48 M/UL (ref 4.1–5.7)
RBC #/AREA URNS HPF: NORMAL /HPF (ref 0–5)
SODIUM SERPL-SCNC: 135 MMOL/L (ref 136–145)
SP GR UR REFRACTOMETRY: 1.01 (ref 1–1.03)
SPECIMEN HOLD: NORMAL
SPECIMEN HOLD: NORMAL
UROBILINOGEN UR QL STRIP.AUTO: 1 EU/DL (ref 0.2–1)
WBC # BLD AUTO: 5.1 K/UL (ref 4.1–11.1)
WBC URNS QL MICRO: NORMAL /HPF (ref 0–4)

## 2024-02-20 PROCEDURE — 36415 COLL VENOUS BLD VENIPUNCTURE: CPT

## 2024-02-20 PROCEDURE — 85025 COMPLETE CBC W/AUTO DIFF WBC: CPT

## 2024-02-20 PROCEDURE — 99285 EMERGENCY DEPT VISIT HI MDM: CPT

## 2024-02-20 PROCEDURE — 6360000004 HC RX CONTRAST MEDICATION: Performed by: RADIOLOGY

## 2024-02-20 PROCEDURE — 80053 COMPREHEN METABOLIC PANEL: CPT

## 2024-02-20 PROCEDURE — 83690 ASSAY OF LIPASE: CPT

## 2024-02-20 PROCEDURE — 81001 URINALYSIS AUTO W/SCOPE: CPT

## 2024-02-20 PROCEDURE — 74177 CT ABD & PELVIS W/CONTRAST: CPT

## 2024-02-20 PROCEDURE — 99214 OFFICE O/P EST MOD 30 MIN: CPT | Performed by: FAMILY MEDICINE

## 2024-02-20 RX ORDER — ONDANSETRON 2 MG/ML
4 INJECTION INTRAMUSCULAR; INTRAVENOUS
Status: DISCONTINUED | OUTPATIENT
Start: 2024-02-20 | End: 2024-02-20

## 2024-02-20 RX ORDER — PANTOPRAZOLE SODIUM 40 MG/1
40 TABLET, DELAYED RELEASE ORAL
Qty: 30 TABLET | Refills: 1 | Status: SHIPPED | OUTPATIENT
Start: 2024-02-20

## 2024-02-20 RX ORDER — 0.9 % SODIUM CHLORIDE 0.9 %
1000 INTRAVENOUS SOLUTION INTRAVENOUS ONCE
Status: DISCONTINUED | OUTPATIENT
Start: 2024-02-20 | End: 2024-02-20

## 2024-02-20 RX ORDER — DICYCLOMINE HYDROCHLORIDE 10 MG/1
10 CAPSULE ORAL
Qty: 120 CAPSULE | Refills: 0 | Status: SHIPPED | OUTPATIENT
Start: 2024-02-20 | End: 2024-02-20 | Stop reason: DRUGHIGH

## 2024-02-20 RX ORDER — KETOROLAC TROMETHAMINE 10 MG/1
10 TABLET, FILM COATED ORAL 3 TIMES DAILY PRN
Qty: 15 TABLET | Refills: 0 | Status: SHIPPED | OUTPATIENT
Start: 2024-02-20

## 2024-02-20 RX ORDER — DICYCLOMINE HCL 20 MG
20 TABLET ORAL 4 TIMES DAILY
Qty: 20 TABLET | Refills: 0 | Status: SHIPPED | OUTPATIENT
Start: 2024-02-20

## 2024-02-20 RX ADMIN — IOPAMIDOL 100 ML: 755 INJECTION, SOLUTION INTRAVENOUS at 13:00

## 2024-02-20 ASSESSMENT — ENCOUNTER SYMPTOMS
NAUSEA: 0
BACK PAIN: 0
ABDOMINAL PAIN: 1
VOMITING: 0
SHORTNESS OF BREATH: 0
TROUBLE SWALLOWING: 0
DIARRHEA: 0
COUGH: 0
CONSTIPATION: 0

## 2024-02-20 NOTE — ED TRIAGE NOTES
Pt c/o right sided abd pain, denies fever, denies n/v, denies diarrhea or constipation, hx of ulcers and feels the same , taking NSAIDS

## 2024-02-20 NOTE — PROGRESS NOTES
Kumar Yu, was evaluated through a synchronous (real-time) audio-video encounter. The patient (or guardian if applicable) is aware that this is a billable service, which includes applicable co-pays. This Virtual Visit was conducted with patient's (and/or legal guardian's) consent. Patient identification was verified, and a caregiver was present when appropriate.   The patient was located at Home: [address] [facility] [phone]  Provider was located at Facility (Appt Dept): [address] [place] [id number]      Kumar Yu (:  1985) is a Established patient, presenting virtually for evaluation of the following:    Assessment & Plan   Below is the assessment and plan developed based on review of pertinent history, physical exam, labs, studies, and medications.  1. Right sided abdominal pain  -     Adam Moreno MD, Gastroenterology, [place] ([facility])  With close proximity to appendix, patient understands that he will need to go to ER for further workup.  This is a second episode of abdominal pain encouraged this time for patient to follow-up with GI.  Return if symptoms worsen or fail to improve.       Subjective   Patient is a 38-year-old male who states that he has had right-sided abdominal pain since .  Patient states that he was having upper respiratory symptoms of wheezing and myalgias so he started taking ibuprofen 800 mg states that his last dose was on .  Shortly after this on  he developed right-sided abdominal pain.  No nausea or vomiting, no bleeding diarrhea or constipation.  Patient states that he had a bout of something similar back in July.  Patient points that his pain is localized to the right of his umbilicus.  Pain is kept him up at night.  Medications tried are leftover Bentyl from his last bowel in July which has not helped.  He is requesting Toradol.  Patient did not follow-up with GI after last episode       Review of Systems

## 2024-02-20 NOTE — ED NOTES
Discharged by provider    Patient left ED in no acute distress, alert and oriented x4. Patient was encouraged to come back if symptoms get worse. Patient was provided with discharge instructions and prescriptions. All questions were answered. Patient left ambulatory.

## 2024-02-20 NOTE — ED PROVIDER NOTES
[facility] EMERGENCY DEP  EMERGENCY DEPARTMENT ENCOUNTER      Pt Name: Kumar Yu  MRN: [id number]  Birthdate 1985  Date of evaluation: 2/20/2024  Provider: CAMREN Bello NP    CHIEF COMPLAINT       Chief Complaint   Patient presents with    Abdominal Pain         HISTORY OF PRESENT ILLNESS   (Location/Symptom, Timing/Onset, Context/Setting, Quality, Duration, Modifying Factors, Severity)  Note limiting factors.   HPI  Patient is a 38-year-old male with no significant past medical history who presents to the ED with right-sided mid to upper abdominal pain since Sunday.  He states he has a history of gastric ulcer and states it feels similar.  He has been using Motrin without relief.Denies any fever, difficulty breathing, difficulty swallowing, SOB or chest pain. Denies any nausea, vomiting, urinary symptoms, constipation or diarrhea. Pt. Reports that he has not had any food or medications today prior to arrival.  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      Review of External Medical Records:     Nursing Notes were reviewed.    REVIEW OF SYSTEMS    (2-9 systems for level 4, 10 or more for level 5)     Review of Systems   Constitutional:  Negative for activity change, appetite change, fever and unexpected weight change.   HENT:  Negative for congestion and trouble swallowing.    Eyes:  Negative for visual disturbance.   Respiratory:  Negative for cough and shortness of breath.    Cardiovascular:  Negative for chest pain, palpitations and leg swelling.   Gastrointestinal:  Positive for abdominal pain. Negative for constipation, diarrhea, nausea and vomiting.   Musculoskeletal:  Negative for back pain and neck pain.   Skin:  Negative for rash.   Neurological:  Negative for headaches.   All other systems reviewed and are negative.      Except as noted above the remainder of the review of systems was reviewed and negative.       PAST MEDICAL HISTORY     Past Medical History:   Diagnosis Date    Asthma

## 2024-05-03 ENCOUNTER — OFFICE VISIT (OUTPATIENT)
Age: 39
End: 2024-05-03

## 2024-05-03 VITALS
BODY MASS INDEX: 28.82 KG/M2 | HEIGHT: 70 IN | WEIGHT: 201.3 LBS | DIASTOLIC BLOOD PRESSURE: 81 MMHG | SYSTOLIC BLOOD PRESSURE: 122 MMHG | OXYGEN SATURATION: 99 % | HEART RATE: 130 BPM | TEMPERATURE: 102.4 F

## 2024-05-03 DIAGNOSIS — J02.0 STREPTOCOCCAL SORE THROAT: Primary | ICD-10-CM

## 2024-05-03 LAB
EXP DATE SOLUTION: NORMAL
EXP DATE SWAB: NORMAL
EXPIRATION DATE: NORMAL
GROUP A STREP ANTIGEN, POC: POSITIVE
INFLUENZA A ANTIGEN, POC: NORMAL
INFLUENZA B ANTIGEN, POC: NORMAL
LOT NUMBER POC: NORMAL
LOT NUMBER SOLUTION: NORMAL
LOT NUMBER SWAB: NORMAL
SARS-COV-2 RNA, POC: NEGATIVE
VALID INTERNAL CONTROL, POC: ABNORMAL

## 2024-05-03 RX ORDER — ACETAMINOPHEN 500 MG
975 TABLET ORAL ONCE
Status: COMPLETED | OUTPATIENT
Start: 2024-05-03 | End: 2024-05-03

## 2024-05-03 RX ORDER — AMOXICILLIN AND CLAVULANATE POTASSIUM 875; 125 MG/1; MG/1
1 TABLET, FILM COATED ORAL 2 TIMES DAILY
Qty: 20 TABLET | Refills: 0 | Status: SHIPPED | OUTPATIENT
Start: 2024-05-03 | End: 2024-05-13

## 2024-05-03 RX ORDER — FLUTICASONE PROPIONATE AND SALMETEROL 100; 50 UG/1; UG/1
1 POWDER RESPIRATORY (INHALATION) 2 TIMES DAILY
COMMUNITY
Start: 2024-02-19

## 2024-05-03 RX ORDER — PREDNISONE 10 MG/1
TABLET ORAL
Qty: 21 EACH | Refills: 0 | Status: SHIPPED | OUTPATIENT
Start: 2024-05-03

## 2024-05-03 RX ORDER — IPRATROPIUM BROMIDE AND ALBUTEROL SULFATE 2.5; .5 MG/3ML; MG/3ML
1 SOLUTION RESPIRATORY (INHALATION) ONCE
Status: COMPLETED | OUTPATIENT
Start: 2024-05-03 | End: 2024-05-03

## 2024-05-03 RX ORDER — ONDANSETRON 4 MG/1
4 TABLET, ORALLY DISINTEGRATING ORAL 3 TIMES DAILY PRN
Qty: 21 TABLET | Refills: 0 | Status: SHIPPED | OUTPATIENT
Start: 2024-05-03

## 2024-05-03 RX ORDER — DEXTROMETHORPHAN HYDROBROMIDE AND PROMETHAZINE HYDROCHLORIDE 15; 6.25 MG/5ML; MG/5ML
5 SYRUP ORAL 4 TIMES DAILY PRN
Qty: 140 ML | Refills: 0 | Status: SHIPPED | OUTPATIENT
Start: 2024-05-03 | End: 2024-05-10

## 2024-05-03 RX ADMIN — IPRATROPIUM BROMIDE AND ALBUTEROL SULFATE 1 DOSE: 2.5; .5 SOLUTION RESPIRATORY (INHALATION) at 19:00

## 2024-05-03 RX ADMIN — Medication 975 MG: at 18:47

## 2024-05-03 NOTE — PATIENT INSTRUCTIONS
Thank you for visiting [facility].    -Change toothbrush after 24 hour of antibiotic use**  -Avoid kissing or sharing beverages/utensils until all symptoms resolve    Bacterial Infection:  Antibiotic:  Take as prescribed on full stomach until you complete entire course and with a probiotic  Pain/fever/chills/body aches:  Tylenol every 4 hours OR Ibuprofen every 6 hours as needed  Sore Throat:  Lozenges, as needed.  Cepacol lozenges will help numb the throat  Chloraseptic spray also helps to numb throat pain  Salt water gargles to soothe throat pain with 1/2-1 tsp of Benadryl  Ice, popsicles, cold food to soothe throat    Please follow-up with primary care provider within 2-5 days if signs and symptoms have not resolved or worsened.    Please go immediately to the Emergency Department if you develop difficulty swallowing/breathing, respiratory distress, hoarse voice, drooling, difficulty opening jaw, stiff or swollen neck, shortness of breath, or uncontrollable fever/nausea/vomiting.

## 2024-05-12 NOTE — PROGRESS NOTES
Subjective       Chief Complaint   Patient presents with    URI     Body aches, chills, wheezing, headache, nausea & vomiting this morning. Congestion, productive cough and runny nose.       Patient is 38 year old male presenting with body aches, chills, headache, congestion, productive cough, nausea and vomiting.  Symptoms began this morning.  Denies fever.              Past Medical History:   Diagnosis Date    Asthma     Ill-defined condition     L knee       History reviewed. No pertinent surgical history.    History reviewed. No pertinent family history.    No Known Allergies    Social History     Tobacco Use    Smoking status: Never    Smokeless tobacco: Never   Substance Use Topics    Alcohol use: Yes    Drug use: No       Vitals:    05/03/24 1825   BP: 122/81   Pulse: (!) 130   Temp: (!) 102.4 °F (39.1 °C)   SpO2: 99%       Review of Systems   Constitutional:  Positive for chills. Negative for fever.   HENT:  Positive for congestion, rhinorrhea and sore throat.    Respiratory:  Positive for cough. Negative for shortness of breath.    Cardiovascular:  Negative for chest pain.   Gastrointestinal:  Positive for nausea and vomiting.   Musculoskeletal:  Positive for myalgias.   Neurological:  Positive for headaches.       Objective     Physical Exam  Constitutional:       General: He is not in acute distress.     Appearance: Normal appearance. He is not ill-appearing.   HENT:      Head: Normocephalic and atraumatic.      Mouth/Throat:      Mouth: Mucous membranes are moist.      Pharynx: Posterior oropharyngeal erythema present.   Eyes:      Extraocular Movements: Extraocular movements intact.      Conjunctiva/sclera: Conjunctivae normal.      Pupils: Pupils are equal, round, and reactive to light.   Cardiovascular:      Rate and Rhythm: Normal rate and regular rhythm.      Pulses: Normal pulses.      Heart sounds: Normal heart sounds.   Pulmonary:      Effort: Pulmonary effort is normal.      Breath sounds:

## 2024-05-14 ASSESSMENT — ENCOUNTER SYMPTOMS
SHORTNESS OF BREATH: 0
VOMITING: 1
NAUSEA: 1
SORE THROAT: 1
COUGH: 1

## 2024-06-28 ENCOUNTER — OFFICE VISIT (OUTPATIENT)
Facility: CLINIC | Age: 39
End: 2024-06-28
Payer: COMMERCIAL

## 2024-06-28 VITALS
TEMPERATURE: 98.3 F | DIASTOLIC BLOOD PRESSURE: 79 MMHG | WEIGHT: 193 LBS | HEART RATE: 88 BPM | SYSTOLIC BLOOD PRESSURE: 111 MMHG | OXYGEN SATURATION: 99 % | RESPIRATION RATE: 16 BRPM | HEIGHT: 70 IN | BODY MASS INDEX: 27.63 KG/M2

## 2024-06-28 DIAGNOSIS — R61 NIGHT SWEATS: ICD-10-CM

## 2024-06-28 DIAGNOSIS — Z00.00 VISIT FOR WELL MAN HEALTH CHECK: Primary | ICD-10-CM

## 2024-06-28 DIAGNOSIS — M25.50 ARTHRALGIA, UNSPECIFIED JOINT: ICD-10-CM

## 2024-06-28 DIAGNOSIS — E11.69 CONTROLLED TYPE 2 DIABETES MELLITUS WITH OTHER SPECIFIED COMPLICATION, WITHOUT LONG-TERM CURRENT USE OF INSULIN (HCC): ICD-10-CM

## 2024-06-28 LAB
ALBUMIN SERPL-MCNC: 3.4 G/DL (ref 3.5–5)
ALBUMIN/GLOB SERPL: 0.7 (ref 1.1–2.2)
ALP SERPL-CCNC: 203 U/L (ref 45–117)
ALT SERPL-CCNC: 31 U/L (ref 12–78)
ANION GAP SERPL CALC-SCNC: 2 MMOL/L (ref 5–15)
AST SERPL-CCNC: 30 U/L (ref 15–37)
BASOPHILS # BLD: 0 K/UL (ref 0–0.1)
BASOPHILS NFR BLD: 1 % (ref 0–1)
BILIRUB SERPL-MCNC: 0.6 MG/DL (ref 0.2–1)
BUN SERPL-MCNC: 11 MG/DL (ref 6–20)
BUN/CREAT SERPL: 12 (ref 12–20)
CALCIUM SERPL-MCNC: 9.5 MG/DL (ref 8.5–10.1)
CHLORIDE SERPL-SCNC: 105 MMOL/L (ref 97–108)
CHOLEST SERPL-MCNC: 144 MG/DL
CO2 SERPL-SCNC: 29 MMOL/L (ref 21–32)
CREAT SERPL-MCNC: 0.93 MG/DL (ref 0.7–1.3)
CREAT UR-MCNC: 208 MG/DL
CRP SERPL HS-MCNC: >9.5 MG/L
DIFFERENTIAL METHOD BLD: ABNORMAL
EOSINOPHIL # BLD: 0.4 K/UL (ref 0–0.4)
EOSINOPHIL NFR BLD: 8 % (ref 0–7)
ERYTHROCYTE [DISTWIDTH] IN BLOOD BY AUTOMATED COUNT: 15.4 % (ref 11.5–14.5)
EST. AVERAGE GLUCOSE BLD GHB EST-MCNC: 126 MG/DL
GLOBULIN SER CALC-MCNC: 4.8 G/DL (ref 2–4)
GLUCOSE SERPL-MCNC: 111 MG/DL (ref 65–100)
HBA1C MFR BLD: 6 % (ref 4–5.6)
HCT VFR BLD AUTO: 37.5 % (ref 36.6–50.3)
HDLC SERPL-MCNC: 37 MG/DL
HDLC SERPL: 3.9 (ref 0–5)
HGB BLD-MCNC: 12.7 G/DL (ref 12.1–17)
IMM GRANULOCYTES # BLD AUTO: 0 K/UL (ref 0–0.04)
IMM GRANULOCYTES NFR BLD AUTO: 0 % (ref 0–0.5)
LDLC SERPL CALC-MCNC: 93.6 MG/DL (ref 0–100)
LYMPHOCYTES # BLD: 1.5 K/UL (ref 0.8–3.5)
LYMPHOCYTES NFR BLD: 29 % (ref 12–49)
MCH RBC QN AUTO: 34.3 PG (ref 26–34)
MCHC RBC AUTO-ENTMCNC: 33.9 G/DL (ref 30–36.5)
MCV RBC AUTO: 101.4 FL (ref 80–99)
MICROALBUMIN UR-MCNC: 0.85 MG/DL
MICROALBUMIN/CREAT UR-RTO: 4 MG/G (ref 0–30)
MONOCYTES # BLD: 0.5 K/UL (ref 0–1)
MONOCYTES NFR BLD: 9 % (ref 5–13)
NEUTS SEG # BLD: 2.8 K/UL (ref 1.8–8)
NEUTS SEG NFR BLD: 53 % (ref 32–75)
NRBC # BLD: 0 K/UL (ref 0–0.01)
NRBC BLD-RTO: 0 PER 100 WBC
PLATELET # BLD AUTO: 303 K/UL (ref 150–400)
PMV BLD AUTO: 10.6 FL (ref 8.9–12.9)
POTASSIUM SERPL-SCNC: 4.5 MMOL/L (ref 3.5–5.1)
PROT SERPL-MCNC: 8.2 G/DL (ref 6.4–8.2)
RBC # BLD AUTO: 3.7 M/UL (ref 4.1–5.7)
SODIUM SERPL-SCNC: 136 MMOL/L (ref 136–145)
T4 FREE SERPL-MCNC: 1.2 NG/DL (ref 0.8–1.5)
TRIGL SERPL-MCNC: 67 MG/DL
TSH SERPL DL<=0.05 MIU/L-ACNC: 1.18 UIU/ML (ref 0.36–3.74)
VLDLC SERPL CALC-MCNC: 13.4 MG/DL
WBC # BLD AUTO: 5.2 K/UL (ref 4.1–11.1)

## 2024-06-28 PROCEDURE — 99214 OFFICE O/P EST MOD 30 MIN: CPT | Performed by: FAMILY MEDICINE

## 2024-06-28 PROCEDURE — 99395 PREV VISIT EST AGE 18-39: CPT | Performed by: FAMILY MEDICINE

## 2024-06-28 RX ORDER — HYDRALAZINE HYDROCHLORIDE 25 MG/1
25 TABLET, FILM COATED ORAL 3 TIMES DAILY
COMMUNITY

## 2024-06-28 ASSESSMENT — PATIENT HEALTH QUESTIONNAIRE - PHQ9
SUM OF ALL RESPONSES TO PHQ QUESTIONS 1-9: 0
2. FEELING DOWN, DEPRESSED OR HOPELESS: NOT AT ALL
SUM OF ALL RESPONSES TO PHQ QUESTIONS 1-9: 0
SUM OF ALL RESPONSES TO PHQ9 QUESTIONS 1 & 2: 0
1. LITTLE INTEREST OR PLEASURE IN DOING THINGS: NOT AT ALL

## 2024-06-28 NOTE — PROGRESS NOTES
The patient has received an after-visit summary and questions were answered concerning future plans.       Medication Side Effects and Warnings were discussed with patient,  Patient Labs were reviewed and or requested, and  Patient Past Records were reviewed and or requested  Yes         Pt agrees to call or return to clinic and/or go to closest ER with any worsening of symptoms.  This may include, but not limited to increased fever (>100.4) with NSAIDS or Tylenol, increased edema, confusion, rash, worsening of presenting symptoms.    Please note that this dictation was completed with [employer], the [employer] voice recognition software.  Quite often unanticipated grammatical, syntax, homophones, and other interpretive errors are inadvertently transcribed by the computer software.  Please disregard these errors.  Please excuse any errors that have escaped final proofreading.  Thank you.    The patient (or guardian, if applicable) and other individuals in attendance with the patient were advised that Artificial Intelligence will be utilized during this visit to record and process the conversation to generate a clinical note. The patient (or guardian, if applicable) and other individuals in attendance at the appointment consented to the use of AI, including the recording.

## 2025-05-12 RX ORDER — MONTELUKAST SODIUM 10 MG/1
10 TABLET ORAL DAILY
Qty: 30 TABLET | Refills: 3 | Status: SHIPPED | OUTPATIENT
Start: 2025-05-12

## 2025-05-12 RX ORDER — ALBUTEROL SULFATE 90 UG/1
2 INHALANT RESPIRATORY (INHALATION) EVERY 4 HOURS PRN
Qty: 18 G | Refills: 2 | Status: SHIPPED | OUTPATIENT
Start: 2025-05-12

## 2025-05-12 RX ORDER — FLUTICASONE PROPIONATE AND SALMETEROL 100; 50 UG/1; UG/1
1 POWDER RESPIRATORY (INHALATION) 2 TIMES DAILY
Qty: 14 EACH | Refills: 4 | Status: SHIPPED | OUTPATIENT
Start: 2025-05-12

## 2025-05-12 RX ORDER — ALBUTEROL SULFATE 0.83 MG/ML
SOLUTION RESPIRATORY (INHALATION)
Qty: 120 EACH | Refills: 3 | Status: SHIPPED | OUTPATIENT
Start: 2025-05-12

## 2025-05-12 RX ORDER — ALBUTEROL SULFATE 0.83 MG/ML
SOLUTION RESPIRATORY (INHALATION)
Qty: 120 EACH | Refills: 3 | Status: SHIPPED | OUTPATIENT
Start: 2025-05-12 | End: 2025-05-12 | Stop reason: SDUPTHER